# Patient Record
Sex: MALE | Race: BLACK OR AFRICAN AMERICAN | NOT HISPANIC OR LATINO | Employment: FULL TIME | URBAN - METROPOLITAN AREA
[De-identification: names, ages, dates, MRNs, and addresses within clinical notes are randomized per-mention and may not be internally consistent; named-entity substitution may affect disease eponyms.]

---

## 2017-06-20 LAB — EKG IMPRESSION: NORMAL

## 2017-06-20 PROCEDURE — 93005 ELECTROCARDIOGRAM TRACING: CPT

## 2017-06-20 PROCEDURE — 99285 EMERGENCY DEPT VISIT HI MDM: CPT

## 2017-06-20 ASSESSMENT — PAIN SCALES - GENERAL: PAINLEVEL_OUTOF10: 8

## 2017-06-21 ENCOUNTER — APPOINTMENT (OUTPATIENT)
Dept: RADIOLOGY | Facility: MEDICAL CENTER | Age: 42
DRG: 871 | End: 2017-06-21

## 2017-06-21 ENCOUNTER — RESOLUTE PROFESSIONAL BILLING HOSPITAL PROF FEE (OUTPATIENT)
Dept: HOSPITALIST | Facility: MEDICAL CENTER | Age: 42
End: 2017-06-21

## 2017-06-21 ENCOUNTER — HOSPITAL ENCOUNTER (OUTPATIENT)
Dept: RADIOLOGY | Facility: MEDICAL CENTER | Age: 42
End: 2017-06-21

## 2017-06-21 ENCOUNTER — HOSPITAL ENCOUNTER (INPATIENT)
Facility: MEDICAL CENTER | Age: 42
LOS: 3 days | DRG: 871 | End: 2017-06-24
Attending: EMERGENCY MEDICINE | Admitting: INTERNAL MEDICINE

## 2017-06-21 DIAGNOSIS — R65.21 SEPTIC SHOCK(785.52): ICD-10-CM

## 2017-06-21 DIAGNOSIS — J18.9 PNEUMONIA OF RIGHT LOWER LOBE DUE TO INFECTIOUS ORGANISM: ICD-10-CM

## 2017-06-21 DIAGNOSIS — A41.9 SEPSIS, DUE TO UNSPECIFIED ORGANISM: ICD-10-CM

## 2017-06-21 DIAGNOSIS — A41.9 SEPTIC SHOCK(785.52): ICD-10-CM

## 2017-06-21 DIAGNOSIS — E87.6 HYPOKALEMIA: ICD-10-CM

## 2017-06-21 DIAGNOSIS — E87.20 LACTIC ACID ACIDOSIS: ICD-10-CM

## 2017-06-21 DIAGNOSIS — E87.1 HYPONATREMIA: ICD-10-CM

## 2017-06-21 LAB
ABO GROUP BLD: NORMAL
ALBUMIN SERPL BCP-MCNC: 3.6 G/DL (ref 3.2–4.9)
ALBUMIN/GLOB SERPL: 0.8 G/DL
ALP SERPL-CCNC: 74 U/L (ref 30–99)
ALT SERPL-CCNC: 9 U/L (ref 2–50)
ANION GAP SERPL CALC-SCNC: 14 MMOL/L (ref 0–11.9)
ANISOCYTOSIS BLD QL SMEAR: ABNORMAL
APTT PPP: 34.3 SEC (ref 24.7–36)
AST SERPL-CCNC: 17 U/L (ref 12–45)
BASOPHILS # BLD AUTO: 0 % (ref 0–1.8)
BASOPHILS # BLD: 0 K/UL (ref 0–0.12)
BILIRUB SERPL-MCNC: 0.7 MG/DL (ref 0.1–1.5)
BLD GP AB SCN SERPL QL: NORMAL
BNP SERPL-MCNC: 49 PG/ML (ref 0–100)
BUN SERPL-MCNC: 20 MG/DL (ref 8–22)
CALCIUM SERPL-MCNC: 8.6 MG/DL (ref 8.5–10.5)
CHLORIDE SERPL-SCNC: 95 MMOL/L (ref 96–112)
CO2 SERPL-SCNC: 20 MMOL/L (ref 20–33)
CREAT SERPL-MCNC: 1.11 MG/DL (ref 0.5–1.4)
DEPRECATED D DIMER PPP IA-ACNC: 1236 NG/ML(D-DU)
EOSINOPHIL # BLD AUTO: 0 K/UL (ref 0–0.51)
EOSINOPHIL NFR BLD: 0 % (ref 0–6.9)
ERYTHROCYTE [DISTWIDTH] IN BLOOD BY AUTOMATED COUNT: 45.7 FL (ref 35.9–50)
GFR SERPL CREATININE-BSD FRML MDRD: >60 ML/MIN/1.73 M 2
GLOBULIN SER CALC-MCNC: 4.3 G/DL (ref 1.9–3.5)
GLUCOSE SERPL-MCNC: 101 MG/DL (ref 65–99)
HCT VFR BLD AUTO: 38.8 % (ref 42–52)
HGB BLD-MCNC: 13.5 G/DL (ref 14–18)
INR PPP: 1.2 (ref 0.87–1.13)
LACTATE BLD-SCNC: 1.9 MMOL/L (ref 0.5–2)
LACTATE BLD-SCNC: 2.3 MMOL/L (ref 0.5–2)
LACTATE BLD-SCNC: 3 MMOL/L (ref 0.5–2)
LIPASE SERPL-CCNC: <3 U/L (ref 11–82)
LYMPHOCYTES # BLD AUTO: 0.92 K/UL (ref 1–4.8)
LYMPHOCYTES NFR BLD: 4.3 % (ref 22–41)
MANUAL DIFF BLD: NORMAL
MCH RBC QN AUTO: 28.1 PG (ref 27–33)
MCHC RBC AUTO-ENTMCNC: 34.8 G/DL (ref 33.7–35.3)
MCV RBC AUTO: 80.8 FL (ref 81.4–97.8)
MICROCYTES BLD QL SMEAR: ABNORMAL
MONOCYTES # BLD AUTO: 0.75 K/UL (ref 0–0.85)
MONOCYTES NFR BLD AUTO: 3.5 % (ref 0–13.4)
MORPHOLOGY BLD-IMP: NORMAL
MYELOCYTES NFR BLD MANUAL: 1.7 %
NEUTROPHILS # BLD AUTO: 19.37 K/UL (ref 1.82–7.42)
NEUTROPHILS NFR BLD: 83.5 % (ref 44–72)
NEUTS BAND NFR BLD MANUAL: 7 % (ref 0–10)
NRBC # BLD AUTO: 0 K/UL
NRBC BLD AUTO-RTO: 0 /100 WBC
PLATELET # BLD AUTO: 279 K/UL (ref 164–446)
PLATELET BLD QL SMEAR: NORMAL
PMV BLD AUTO: 8.5 FL (ref 9–12.9)
POTASSIUM SERPL-SCNC: 3.2 MMOL/L (ref 3.6–5.5)
PROT SERPL-MCNC: 7.9 G/DL (ref 6–8.2)
PROTHROMBIN TIME: 15.6 SEC (ref 12–14.6)
RBC # BLD AUTO: 4.8 M/UL (ref 4.7–6.1)
RBC BLD AUTO: PRESENT
RH BLD: NORMAL
SODIUM SERPL-SCNC: 129 MMOL/L (ref 135–145)
TROPONIN I SERPL-MCNC: <0.01 NG/ML (ref 0–0.04)
WBC # BLD AUTO: 21.4 K/UL (ref 4.8–10.8)

## 2017-06-21 PROCEDURE — 700105 HCHG RX REV CODE 258: Performed by: INTERNAL MEDICINE

## 2017-06-21 PROCEDURE — 700102 HCHG RX REV CODE 250 W/ 637 OVERRIDE(OP): Performed by: INTERNAL MEDICINE

## 2017-06-21 PROCEDURE — 36415 COLL VENOUS BLD VENIPUNCTURE: CPT

## 2017-06-21 PROCEDURE — 86901 BLOOD TYPING SEROLOGIC RH(D): CPT

## 2017-06-21 PROCEDURE — 94760 N-INVAS EAR/PLS OXIMETRY 1: CPT

## 2017-06-21 PROCEDURE — 700117 HCHG RX CONTRAST REV CODE 255: Performed by: EMERGENCY MEDICINE

## 2017-06-21 PROCEDURE — 700111 HCHG RX REV CODE 636 W/ 250 OVERRIDE (IP): Performed by: EMERGENCY MEDICINE

## 2017-06-21 PROCEDURE — 99223 1ST HOSP IP/OBS HIGH 75: CPT | Performed by: INTERNAL MEDICINE

## 2017-06-21 PROCEDURE — 700111 HCHG RX REV CODE 636 W/ 250 OVERRIDE (IP)

## 2017-06-21 PROCEDURE — 700102 HCHG RX REV CODE 250 W/ 637 OVERRIDE(OP): Performed by: HOSPITALIST

## 2017-06-21 PROCEDURE — 83605 ASSAY OF LACTIC ACID: CPT | Mod: 91

## 2017-06-21 PROCEDURE — 80053 COMPREHEN METABOLIC PANEL: CPT

## 2017-06-21 PROCEDURE — 85379 FIBRIN DEGRADATION QUANT: CPT

## 2017-06-21 PROCEDURE — 96375 TX/PRO/DX INJ NEW DRUG ADDON: CPT

## 2017-06-21 PROCEDURE — 85730 THROMBOPLASTIN TIME PARTIAL: CPT

## 2017-06-21 PROCEDURE — 304562 HCHG STAT O2 MASK/CANNULA

## 2017-06-21 PROCEDURE — A9270 NON-COVERED ITEM OR SERVICE: HCPCS | Performed by: INTERNAL MEDICINE

## 2017-06-21 PROCEDURE — 770020 HCHG ROOM/CARE - TELE (206)

## 2017-06-21 PROCEDURE — 83690 ASSAY OF LIPASE: CPT

## 2017-06-21 PROCEDURE — 96365 THER/PROPH/DIAG IV INF INIT: CPT

## 2017-06-21 PROCEDURE — 86850 RBC ANTIBODY SCREEN: CPT

## 2017-06-21 PROCEDURE — 83880 ASSAY OF NATRIURETIC PEPTIDE: CPT

## 2017-06-21 PROCEDURE — 87040 BLOOD CULTURE FOR BACTERIA: CPT | Mod: 91

## 2017-06-21 PROCEDURE — 86900 BLOOD TYPING SEROLOGIC ABO: CPT

## 2017-06-21 PROCEDURE — 74177 CT ABD & PELVIS W/CONTRAST: CPT

## 2017-06-21 PROCEDURE — 71010 DX-CHEST-LIMITED (1 VIEW): CPT

## 2017-06-21 PROCEDURE — 71275 CT ANGIOGRAPHY CHEST: CPT

## 2017-06-21 PROCEDURE — 86713 LEGIONELLA ANTIBODY: CPT | Mod: 91

## 2017-06-21 PROCEDURE — 700105 HCHG RX REV CODE 258: Performed by: EMERGENCY MEDICINE

## 2017-06-21 PROCEDURE — 85027 COMPLETE CBC AUTOMATED: CPT

## 2017-06-21 PROCEDURE — A9270 NON-COVERED ITEM OR SERVICE: HCPCS | Performed by: HOSPITALIST

## 2017-06-21 PROCEDURE — 85007 BL SMEAR W/DIFF WBC COUNT: CPT

## 2017-06-21 PROCEDURE — 85610 PROTHROMBIN TIME: CPT

## 2017-06-21 PROCEDURE — 84484 ASSAY OF TROPONIN QUANT: CPT

## 2017-06-21 PROCEDURE — 96367 TX/PROPH/DG ADDL SEQ IV INF: CPT

## 2017-06-21 PROCEDURE — 700111 HCHG RX REV CODE 636 W/ 250 OVERRIDE (IP): Performed by: INTERNAL MEDICINE

## 2017-06-21 RX ORDER — SODIUM CHLORIDE 9 MG/ML
1000 INJECTION, SOLUTION INTRAVENOUS ONCE
Status: COMPLETED | OUTPATIENT
Start: 2017-06-21 | End: 2017-06-21

## 2017-06-21 RX ORDER — AMOXICILLIN 250 MG
2 CAPSULE ORAL 2 TIMES DAILY
Status: DISCONTINUED | OUTPATIENT
Start: 2017-06-21 | End: 2017-06-24 | Stop reason: HOSPADM

## 2017-06-21 RX ORDER — POTASSIUM CHLORIDE 750 MG/1
20 TABLET, FILM COATED, EXTENDED RELEASE ORAL DAILY
Status: DISCONTINUED | OUTPATIENT
Start: 2017-06-21 | End: 2017-06-24 | Stop reason: HOSPADM

## 2017-06-21 RX ORDER — MORPHINE SULFATE 4 MG/ML
4 INJECTION, SOLUTION INTRAMUSCULAR; INTRAVENOUS ONCE
Status: COMPLETED | OUTPATIENT
Start: 2017-06-21 | End: 2017-06-21

## 2017-06-21 RX ORDER — ONDANSETRON 2 MG/ML
4 INJECTION INTRAMUSCULAR; INTRAVENOUS ONCE
Status: COMPLETED | OUTPATIENT
Start: 2017-06-21 | End: 2017-06-21

## 2017-06-21 RX ORDER — CEFTRIAXONE 2 G/1
2 INJECTION, POWDER, FOR SOLUTION INTRAMUSCULAR; INTRAVENOUS ONCE
Status: COMPLETED | OUTPATIENT
Start: 2017-06-21 | End: 2017-06-21

## 2017-06-21 RX ORDER — ACETAMINOPHEN 325 MG/1
650 TABLET ORAL EVERY 6 HOURS PRN
Status: DISCONTINUED | OUTPATIENT
Start: 2017-06-21 | End: 2017-06-24 | Stop reason: HOSPADM

## 2017-06-21 RX ORDER — SODIUM CHLORIDE 9 MG/ML
30 INJECTION, SOLUTION INTRAVENOUS
Status: DISCONTINUED | OUTPATIENT
Start: 2017-06-21 | End: 2017-06-24 | Stop reason: HOSPADM

## 2017-06-21 RX ORDER — SODIUM CHLORIDE 9 MG/ML
1000 INJECTION, SOLUTION INTRAVENOUS
Status: DISCONTINUED | OUTPATIENT
Start: 2017-06-21 | End: 2017-06-24 | Stop reason: HOSPADM

## 2017-06-21 RX ORDER — AZITHROMYCIN 500 MG/1
500 INJECTION, POWDER, LYOPHILIZED, FOR SOLUTION INTRAVENOUS ONCE
Status: COMPLETED | OUTPATIENT
Start: 2017-06-21 | End: 2017-06-21

## 2017-06-21 RX ORDER — PROMETHAZINE HYDROCHLORIDE 25 MG/1
12.5-25 TABLET ORAL EVERY 4 HOURS PRN
Status: DISCONTINUED | OUTPATIENT
Start: 2017-06-21 | End: 2017-06-24 | Stop reason: HOSPADM

## 2017-06-21 RX ORDER — BISACODYL 10 MG
10 SUPPOSITORY, RECTAL RECTAL
Status: DISCONTINUED | OUTPATIENT
Start: 2017-06-21 | End: 2017-06-24 | Stop reason: HOSPADM

## 2017-06-21 RX ORDER — GUAIFENESIN/DEXTROMETHORPHAN 100-10MG/5
10 SYRUP ORAL EVERY 6 HOURS PRN
Status: DISCONTINUED | OUTPATIENT
Start: 2017-06-21 | End: 2017-06-24 | Stop reason: HOSPADM

## 2017-06-21 RX ORDER — PROMETHAZINE HYDROCHLORIDE 25 MG/1
12.5-25 SUPPOSITORY RECTAL EVERY 4 HOURS PRN
Status: DISCONTINUED | OUTPATIENT
Start: 2017-06-21 | End: 2017-06-24 | Stop reason: HOSPADM

## 2017-06-21 RX ORDER — MORPHINE SULFATE 4 MG/ML
INJECTION, SOLUTION INTRAMUSCULAR; INTRAVENOUS
Status: COMPLETED
Start: 2017-06-21 | End: 2017-06-21

## 2017-06-21 RX ORDER — SODIUM CHLORIDE 9 MG/ML
INJECTION, SOLUTION INTRAVENOUS CONTINUOUS
Status: DISCONTINUED | OUTPATIENT
Start: 2017-06-21 | End: 2017-06-24

## 2017-06-21 RX ORDER — HEPARIN SODIUM 5000 [USP'U]/ML
5000 INJECTION, SOLUTION INTRAVENOUS; SUBCUTANEOUS EVERY 8 HOURS
Status: DISCONTINUED | OUTPATIENT
Start: 2017-06-21 | End: 2017-06-24 | Stop reason: HOSPADM

## 2017-06-21 RX ORDER — ONDANSETRON 2 MG/ML
INJECTION INTRAMUSCULAR; INTRAVENOUS
Status: COMPLETED
Start: 2017-06-21 | End: 2017-06-21

## 2017-06-21 RX ORDER — ONDANSETRON 2 MG/ML
4 INJECTION INTRAMUSCULAR; INTRAVENOUS EVERY 4 HOURS PRN
Status: DISCONTINUED | OUTPATIENT
Start: 2017-06-21 | End: 2017-06-24 | Stop reason: HOSPADM

## 2017-06-21 RX ORDER — POLYETHYLENE GLYCOL 3350 17 G/17G
1 POWDER, FOR SOLUTION ORAL
Status: DISCONTINUED | OUTPATIENT
Start: 2017-06-21 | End: 2017-06-24 | Stop reason: HOSPADM

## 2017-06-21 RX ORDER — ONDANSETRON 4 MG/1
4 TABLET, ORALLY DISINTEGRATING ORAL EVERY 4 HOURS PRN
Status: DISCONTINUED | OUTPATIENT
Start: 2017-06-21 | End: 2017-06-24 | Stop reason: HOSPADM

## 2017-06-21 RX ADMIN — GUAIFENESIN AND DEXTROMETHORPHAN 10 ML: 100; 10 SYRUP ORAL at 10:55

## 2017-06-21 RX ADMIN — GUAIFENESIN AND DEXTROMETHORPHAN 10 ML: 100; 10 SYRUP ORAL at 20:32

## 2017-06-21 RX ADMIN — SODIUM CHLORIDE 1000 ML: 9 INJECTION, SOLUTION INTRAVENOUS at 04:20

## 2017-06-21 RX ADMIN — SODIUM CHLORIDE: 9 INJECTION, SOLUTION INTRAVENOUS at 13:12

## 2017-06-21 RX ADMIN — SENNOSIDES AND DOCUSATE SODIUM 2 TABLET: 8.6; 5 TABLET ORAL at 10:55

## 2017-06-21 RX ADMIN — ACETAMINOPHEN 650 MG: 325 TABLET, FILM COATED ORAL at 10:53

## 2017-06-21 RX ADMIN — SENNOSIDES AND DOCUSATE SODIUM 2 TABLET: 8.6; 5 TABLET ORAL at 20:27

## 2017-06-21 RX ADMIN — SODIUM CHLORIDE 1000 ML: 9 INJECTION, SOLUTION INTRAVENOUS at 01:05

## 2017-06-21 RX ADMIN — ONDANSETRON: 2 INJECTION INTRAMUSCULAR; INTRAVENOUS at 01:30

## 2017-06-21 RX ADMIN — MORPHINE SULFATE 4 MG: 4 INJECTION INTRAVENOUS at 01:30

## 2017-06-21 RX ADMIN — HEPARIN SODIUM 5000 UNITS: 5000 INJECTION, SOLUTION INTRAVENOUS; SUBCUTANEOUS at 14:00

## 2017-06-21 RX ADMIN — CEFTRIAXONE SODIUM 2 G: 2 INJECTION, POWDER, FOR SOLUTION INTRAMUSCULAR; INTRAVENOUS at 04:20

## 2017-06-21 RX ADMIN — SODIUM CHLORIDE: 9 INJECTION, SOLUTION INTRAVENOUS at 21:36

## 2017-06-21 RX ADMIN — AZITHROMYCIN MONOHYDRATE 500 MG: 500 INJECTION, POWDER, LYOPHILIZED, FOR SOLUTION INTRAVENOUS at 04:43

## 2017-06-21 RX ADMIN — IOHEXOL 100 ML: 350 INJECTION, SOLUTION INTRAVENOUS at 03:38

## 2017-06-21 RX ADMIN — ONDANSETRON 4 MG: 2 INJECTION INTRAMUSCULAR; INTRAVENOUS at 21:36

## 2017-06-21 RX ADMIN — HEPARIN SODIUM 5000 UNITS: 5000 INJECTION, SOLUTION INTRAVENOUS; SUBCUTANEOUS at 20:27

## 2017-06-21 RX ADMIN — POTASSIUM CHLORIDE 20 MEQ: 750 TABLET, FILM COATED, EXTENDED RELEASE ORAL at 10:56

## 2017-06-21 RX ADMIN — ONDANSETRON 4 MG: 2 INJECTION INTRAMUSCULAR; INTRAVENOUS at 08:28

## 2017-06-21 RX ADMIN — SODIUM CHLORIDE 1000 ML: 9 INJECTION, SOLUTION INTRAVENOUS at 02:00

## 2017-06-21 RX ADMIN — ONDANSETRON 4 MG: 2 INJECTION INTRAMUSCULAR; INTRAVENOUS at 17:29

## 2017-06-21 RX ADMIN — SODIUM CHLORIDE: 9 INJECTION, SOLUTION INTRAVENOUS at 08:28

## 2017-06-21 RX ADMIN — ONDANSETRON 4 MG: 2 INJECTION INTRAMUSCULAR; INTRAVENOUS at 13:07

## 2017-06-21 RX ADMIN — HEPARIN SODIUM 5000 UNITS: 5000 INJECTION, SOLUTION INTRAVENOUS; SUBCUTANEOUS at 08:28

## 2017-06-21 ASSESSMENT — LIFESTYLE VARIABLES
ALCOHOL_USE: NO
EVER_SMOKED: YES

## 2017-06-21 ASSESSMENT — COGNITIVE AND FUNCTIONAL STATUS - GENERAL
SUGGESTED CMS G CODE MODIFIER MOBILITY: CH
SUGGESTED CMS G CODE MODIFIER DAILY ACTIVITY: CH
MOBILITY SCORE: 24
DAILY ACTIVITIY SCORE: 24

## 2017-06-21 ASSESSMENT — PATIENT HEALTH QUESTIONNAIRE - PHQ9
SUM OF ALL RESPONSES TO PHQ9 QUESTIONS 1 AND 2: 0
SUM OF ALL RESPONSES TO PHQ QUESTIONS 1-9: 0
1. LITTLE INTEREST OR PLEASURE IN DOING THINGS: NOT AT ALL
2. FEELING DOWN, DEPRESSED, IRRITABLE, OR HOPELESS: NOT AT ALL

## 2017-06-21 ASSESSMENT — COPD QUESTIONNAIRES
HAVE YOU SMOKED AT LEAST 100 CIGARETTES IN YOUR ENTIRE LIFE: YES
COPD SCREENING SCORE: 4
DO YOU EVER COUGH UP ANY MUCUS OR PHLEGM?: YES, A FEW DAYS A WEEK OR MONTH
DURING THE PAST 4 WEEKS HOW MUCH DID YOU FEEL SHORT OF BREATH: SOME OF THE TIME

## 2017-06-21 ASSESSMENT — PAIN SCALES - GENERAL: PAINLEVEL_OUTOF10: 4

## 2017-06-21 NOTE — DIETARY
Nutrition Services - Poor PO PTA/Crohns disease     42 YO M admitted r/t PNA, SOB     Past Medical History   Diagnosis Date   • Crohn's colitis (CMS-HCC)      Pertinent Labs: lactic acid 2.3, Na 129, K+ 3.2, Cl 95, anion gap 14, glucose 101, globulin 4.3  Pertinent Meds: zithromax, rocephin, heparin, KCl, senna-docusate   GI: last BM 6/21  WT: 63.1 kg  Body mass index is 21.78 kg/(m^2).  Pt reports 10lbs weight loss x 2 months (6.7%)   SKIN: no pressure areas or edema documented at this time     Diet: regular  PO intake: 25-75%     PLAN/RECOMMEND      Consider MVI r/t crohn's disease    Encourage PO intake.    Recommend boost plus QID (chocolate) per Pt request        Nutrition Rep to see patient daily for meal preferences. Please document PO intake as percentage of meals consumed.     RD following

## 2017-06-21 NOTE — CARE PLAN
Problem: Knowledge Deficit  Goal: Knowledge of disease process/condition, treatment plan, diagnostic tests, and medications will improve  Outcome: PROGRESSING AS EXPECTED  Pt oriented to unit procedures and protocols. All questions answered. No concerns at this time. Pt communicates needs effectively.         Problem: Respiratory:  Goal: Respiratory status will improve  Outcome: PROGRESSING AS EXPECTED  Remains on 2 liters. Pt O2 remains above 92%. Has shortness of breath on exertion.

## 2017-06-21 NOTE — IP AVS SNAPSHOT
6/24/2017    Michael Paul  [address]  [place] [address]    Dear Michael:    [facility] wants to ensure your discharge home is safe and you or your loved ones have had all of your questions answered regarding your care after you leave the hospital.    Below is a list of resources and contact information should you have any questions regarding your hospital stay, follow-up instructions, or active medical symptoms.    Questions or Concerns Regarding… Contact   Medical Questions Related to Your Discharge  (7 days a week, 8am-5pm) Contact a Nurse Care Coordinator   [phone]   Medical Questions Not Related to Your Discharge  (24 hours a day / 7 days a week)  Contact the Nurse Health Line   [phone]    Medications or Discharge Instructions Refer to your discharge packet   or contact your [facility] Primary Care Provider   [phone]   Follow-up Appointment(s) Schedule your appointment via [employer]   or contact Scheduling [phone]   Billing Review your statement via [employer]  or contact Billing [phone]   Medical Records Review your records via [employer]   or contact Medical Records [phone]     You may receive a telephone call within two days of discharge. This call is to make certain you understand your discharge instructions and have the opportunity to have any questions answered. You can also easily access your medical information, test results and upcoming appointments via the [employer] free online health management tool. You can learn more and sign up at [employer]. For assistance setting up your [employer] account, please call [phone].    Once again, we want to ensure your discharge home is safe and that you have a clear understanding of any next steps in your care. If you have any questions or concerns, please do not hesitate to contact us, we are here for you. Thank you for choosing [facility] for your healthcare needs.    Sincerely,    Your [facility] Healthcare Team

## 2017-06-21 NOTE — PROGRESS NOTES
Patient admitted early this morning by Dr. Paul. I saw and examined him in follow up and spoke with his nurse. Orders reviewed, no new orders needed.

## 2017-06-21 NOTE — IP AVS SNAPSHOT
" <p align=\"LEFT\"><IMG SRC=\"//[email or web address]$/[email or web address]\" alt=\"Image\" WIDTH=\"50%\" HEIGHT=\"200\" BORDER=\"\"></p>                   Name:Michael Paul  Medical Record Number:[id number]  CSN: [id number]    YOB: 1975   Age: 41 y.o.  Sex: male  HT:1.702 m (5' 7\") WT: 61.8 kg (136 lb 3.9 oz)          Admit Date: 6/21/2017     Discharge Date:   Today's Date: 6/24/2017  Attending Doctor:  Marie Dumont M.D.                  Allergies:  Prednisone          Follow-up Information     1. Follow up with LEANNE Pedraza. Go on 7/5/2017.    Specialty:  Family Medicine    Why:  Please arrive at 8:30am for your appointment. Please bring 2 pay stubs, photo ID, proof of address, and list of medications.    Contact information    [address] [place] [id number] [phone]           Medication List      Take these Medications        Instructions    azithromycin 250 MG Tabs   Start taking on:  6/25/2017   Commonly known as:  ZITHROMAX    Take one tab daily until complete       cefdinir 300 MG Caps   Commonly known as:  OMNICEF    Take 1 Cap by mouth every 12 hours for 3 days.   Dose:  300 mg         "

## 2017-06-21 NOTE — ED NOTES
Late entry:  0048 - Pt to room via w/c - c/o chest pain, SOB, weakness. EKG done in triage.     10827- PIV established, labs sent    0130 - pt medicated per MAR    0150 - ERP notified about D-dimer    0200 - 2nd liter NS running    0310 - pt to CT

## 2017-06-21 NOTE — IP AVS SNAPSHOT
ConceptoMed Access Code: [email or web address]  Expires: 7/17/2017  4:19 AM    ConceptoMed  A secure, online tool to manage your health information     [employer]’s ConceptoMed® is a secure, online tool that connects you to your personalized health information from the privacy of your home -- day or night - making it very easy for you to manage your healthcare. Once the activation process is completed, you can even access your medical information using the ConceptoMed vicky, which is available for free in the Apple Vicky store or Google Play store.     ConceptoMed provides the following levels of access (as shown below):   My Chart Features   [facility] Primary Care Doctor [facility]  Specialists [facility]  Urgent  Care Non-[facility]  Primary Care  Doctor   Email your healthcare team securely and privately 24/7 X X X X   Manage appointments: schedule your next appointment; view details of past/upcoming appointments X      Request prescription refills. X      View recent personal medical records, including lab and immunizations X X X X   View health record, including health history, allergies, medications X X X X   Read reports about your outpatient visits, procedures, consult and ER notes X X X X   See your discharge summary, which is a recap of your hospital and/or ER visit that includes your diagnosis, lab results, and care plan. X X       How to register for ConceptoMed:  1. Go to  [email or web address].  2. Click on the Sign Up Now box, which takes you to the New Member Sign Up page. You will need to provide the following information:  a. Enter your ConceptoMed Access Code exactly as it appears at the top of this page. (You will not need to use this code after you’ve completed the sign-up process. If you do not sign up before the expiration date, you must request a new code.)   b. Enter your date of birth.   c. Enter your home email address.   d. Click Submit, and follow the next screen’s instructions.  3. Create a ConceptoMed ID. This will be your ConceptoMed  login ID and cannot be changed, so think of one that is secure and easy to remember.  4. Create a [email or web address] password. You can change your password at any time.  5. Enter your Password Reset Question and Answer. This can be used at a later time if you forget your password.   6. Enter your e-mail address. This allows you to receive e-mail notifications when new information is available in [email or web address].  7. Click Sign Up. You can now view your health information.    For assistance activating your [email or web address] account, call [phone]

## 2017-06-21 NOTE — PROGRESS NOTES
2 RN skin assessment completed with ROBB Wilson. Skin warm,dry/intact . No areas of concern noted.

## 2017-06-21 NOTE — IP AVS SNAPSHOT
" Home Care Instructions                                                                                                                  Name:Michael Paul  Medical Record Number:[id number]  CSN: [id number]    YOB: 1975   Age: 41 y.o.  Sex: male  HT:1.702 m (5' 7\") WT: 61.8 kg (136 lb 3.9 oz)          Admit Date: 6/21/2017     Discharge Date:   Today's Date: 6/24/2017  Attending Doctor:  Marie Dumont M.D.                  Allergies:  Prednisone            Discharge Instructions       Discharge Instructions    Discharged to home by car with friend. Discharged via wheelchair, hospital escort: Refused.  Special equipment needed: Not Applicable    Be sure to schedule a follow-up appointment with your primary care doctor or any specialists as instructed.     Discharge Plan:   Smoking Cessation Offered: Patient Refused  Influenza Vaccine Indication: Patient Refuses    I understand that a diet low in cholesterol, fat, and sodium is recommended for good health. Unless I have been given specific instructions below for another diet, I accept this instruction as my diet prescription.   Other diet: regular    Special Instructions: None    · Is patient discharged on Warfarin / Coumadin?   No     · Is patient Post Blood Transfusion?  No    Pneumonia, Adult  Pneumonia is an infection of the lungs.   CAUSES  Pneumonia may be caused by bacteria or a virus. Usually, the infection is caused by breathing in droplets from an infected person's cough or sneeze.   SYMPTOMS   Symptoms of pneumonia include:  · Cough.  · Fever.  · Chest pain.  · Rapid breathing.  · Shortness of breath.  · Shaking chills.  · Mucus production.  DIAGNOSIS   If you have the common symptoms of pneumonia, often your health care provider will confirm the diagnosis with a chest X-ray. The X-ray will show an abnormality in the lung if you have pneumonia. Other tests may be done on your blood, urine, or mucus (sputum) to find the specific cause of your " pneumonia. A blood gas test or pulse oximetry test may be needed to check how well your lungs are working.  TREATMENT   Your treatment will depend on whether your pneumonia is caused by bacteria or a virus.   · Bacterial pneumonia is treated with antibiotic medicine.  · Pneumonia that is caused by the influenza virus may be treated with an antiviral medicine.  · Pneumonia that is caused by a virus other than influenza will not respond to antibiotic medicine. This type of pneumonia will have to run its course.   HOME CARE INSTRUCTIONS   · Cough suppressants may be used if you are losing too much rest from coughing at night. However, you should try to avoid taking cough suppresants. This is because coughing helps to remove mucus from your lungs.  · Sleep in a semi-upright position at night. Try sleeping in a reclining chair, or place a few pillows under your head.  · Try using a cold steam vaporizer or humidifier in your home or bedroom. This may help loosen your mucus.  · If you were prescribed an antibiotic medicine, finish all of it even if you start to feel better.  · If you were prescribed an expectorant, take it as directed by your health care provider. This medicine loosens the mucus so you can cough it up.  · Take medicines only as directed by your health care provider.  · Do not smoke. If you are a smoker and continue to smoke, your cough may last several weeks after your pneumonia has cleared.  · Get rest when you feel tired, or as needed.  PREVENTION  A pneumococcal shot (vaccine) is available to prevent a common bacterial cause of pneumonia. This is usually suggested for:  · People over 65 years old.  · People on chemotherapy.  · People with chronic lung problems, such as bronchitis or emphysema.  · People with immune system problems.  If you are over 65 years old or have a high risk condition, you may receive the pneumococcal vaccine if you have not received it before. In some countries, a routine  influenza vaccine is also recommended. This vaccine can help prevent some cases of pneumonia. You may be offered the influenza vaccine as part of your care.  If you are a smoker, it is time to quit in order to prevent pneumonia in the future. You may receive instructions on how to stop smoking. Your health care provider can provide medicines and counseling to help you quit.  SEEK MEDICAL CARE IF:  · You have a fever.  · You cannot control your cough with suppressants at night, and you keep losing sleep.  SEEK IMMEDIATE MEDICAL CARE IF:   · You have worsening shortness of breath.  · You have increased chest pain.  · Your sickness becomes worse, especially if you are an older adult or have a weakened immune system.  · You cough up blood.  · You have pain that is getting worse or is not controlled with medicines.  · Your symptoms are getting worse rather than better.     This information is not intended to replace advice given to you by your health care provider. Make sure you discuss any questions you have with your health care provider.     Document Released: 12/18/2006 Document Revised: 01/08/2016 Document Reviewed: 04/13/2016  [employer] Interactive Patient Education ©2016 [employer]        Depression / Suicide Risk    As you are discharged from this [facility] facility, it is important to learn how to keep safe from harming yourself.    Recognize the warning signs:  · Abrupt changes in personality, positive or negative- including increase in energy   · Giving away possessions  · Change in eating patterns- significant weight changes-  positive or negative  · Change in sleeping patterns- unable to sleep or sleeping all the time   · Unwillingness or inability to communicate  · Depression  · Unusual sadness, discouragement and loneliness  · Talk of wanting to die  · Neglect of personal appearance   · Rebelliousness- reckless behavior  · Withdrawal from people/activities they love  · Confusion- inability to  concentrate     If you or a loved one observes any of these behaviors or has concerns about self-harm, here's what you can do:  · Talk about it- your feelings and reasons for harming yourself  · Remove any means that you might use to hurt yourself (examples: pills, rope, extension cords, firearm)  · Get professional help from the community (Mental Health, Substance Abuse, psychological counseling)  · Do not be alone:Call your Safe Contact- someone whom you trust who will be there for you.  · Call your local CRISIS HOTLINE 231-6251 or 649-779-1010  · Call your local Children's Mobile Crisis Response Team [place] (678) 859-6726 or www.[employer]  · Call the toll free National Suicide Prevention Hotlines   · National Suicide Prevention Lifeline 595-347-VHLF (2275)  · National Hope Line Network 800-SUICIDE (963-1264)        Follow-up Information     1. Follow up with LEANNE Pedraza. Go on 7/5/2017.    Specialty:  Family Medicine    Why:  Please arrive at 8:30am for your appointment. Please bring 2 pay stubs, photo ID, proof of address, and list of medications.    Contact information    [address] [place] [id number] [phone]           Discharge Medication Instructions:    Below are the medications your physician expects you to take upon discharge:    Review all your home medications and newly ordered medications with your doctor and/or pharmacist. Follow medication instructions as directed by your doctor and/or pharmacist.    Please keep your medication list with you and share with your physician.               Medication List      START taking these medications        Instructions    Morning Afternoon Evening Bedtime    azithromycin 250 MG Tabs   Start taking on:  6/25/2017   Last time this was given:  500 mg on 6/24/2017  8:18 AM   Commonly known as:  ZITHROMAX   Next Dose Due:  Next dose: tomorrow in the AM        Take one tab daily until complete                        cefdinir  300 MG Caps   Last time this was given:  300 mg on 6/24/2017  9:43 AM   Commonly known as:  OMNICEF   Next Dose Due:  Next dose: tonight        Take 1 Cap by mouth every 12 hours for 3 days.   Dose:  300 mg                             Where to Get Your Medications      Information about where to get these medications is not yet available     ! Ask your nurse or doctor about these medications    - azithromycin 250 MG Tabs  - cefdinir 300 MG Caps            Instructions           Diet / Nutrition:    Follow any diet instructions given to you by your doctor or the dietician, including how much salt (sodium) you are allowed each day.    If you are overweight, talk to your doctor about a weight reduction plan.    Activity:    Remain physically active following your doctor's instructions about exercise and activity.    Rest often.     Any time you become even a little tired or short of breath, SIT DOWN and rest.    Worsening Symptoms:    Report any of the following signs and symptoms to the doctor's office immediately:    *Pain of jaw, arm, or neck  *Chest pain not relieved by medication                               *Dizziness or loss of consciousness  *Difficulty breathing even when at rest   *More tired than usual                                       *Bleeding drainage or swelling of surgical site  *Swelling of feet, ankles, legs or stomach                 *Fever (>100ºF)  *Pink or blood tinged sputum  *Weight gain (3lbs/day or 5lbs /week)           *Shock from internal defibrillator (if applicable)  *Palpitations or irregular heartbeats                *Cool and/or numb extremities    Stroke Awareness    Common Risk Factors for Stroke include:    Age  Atrial Fibrillation  Carotid Artery Stenosis  Diabetes Mellitus  Excessive alcohol consumption  High blood pressure  Overweight   Physical inactivity  Smoking    Warning signs and symptoms of a stroke include:    *Sudden numbness or weakness of the face, arm or leg  (especially on one side of the body).  *Sudden confusion, trouble speaking or understanding.  *Sudden trouble seeing in one or both eyes.  *Sudden trouble walking, dizziness, loss of balance or coordination.Sudden severe headache with no known cause.    It is very important to get treatment quickly when a stroke occurs. If you experience any of the above warning signs, call 911 immediately.                   Disclaimer         Quit Smoking / Tobacco Use:    I understand the use of any tobacco products increases my chance of suffering from future heart disease or stroke and could cause other illnesses which may shorten my life. Quitting the use of tobacco products is the single most important thing I can do to improve my health. For further information on smoking / tobacco cessation call a Toll Free Quit Line at 1-967.567.2740 (*National Cancer [place]) or 1-937.929.9456 (American Lung Association) or you can access the web based program at www.lungusa.org.    [place] Tobacco Users Help Line:  (369) 802-5625       Toll Free: 1-796.694.8140  Quit Tobacco Program [facility] (113)629-6160    Crisis Hotline:    [place] Crisis Hotline:  7-325-SULEBBH or 1-383.528.5692    [place] Crisis Hotline:    1-619.511.1658 or 299-913-3758    Discharge Survey:   Thank you for choosing [facility]. We hope we did everything we could to make your hospital stay a pleasant one. You may be receiving a phone survey and we would appreciate your time and participation in answering the questions. Your input is very valuable to us in our efforts to improve our service to our patients and their families.        My signature on this form indicates that:    1. I have reviewed and understand the above information.  2. My questions regarding this information have been answered to my satisfaction.  3. I have formulated a plan with my discharge nurse to obtain my prescribed medications for home.                  Disclaimer           __________________________________                     __________       ________                       Patient Signature                                                 Date                    Time

## 2017-06-21 NOTE — H&P
DATE OF SERVICE:  06/21/2017    CHIEF COMPLAINT: Cough and shortness of breath.    HISTORY OF PRESENT ILLNESS:  This is a 41-year-old male with no past medical   history who works at [employer], basically woke up Monday morning feeling short of   breath, feeling nauseated, not much coughing, but he started having fever and   chills.  As the day went on, his breathing got worse and he started coughing   with productive phlegm.  He came to the point that he felt tired and with body   malaise.  Patient decided to come to the hospital [facility].  He also noted   that when he cough, he cough up some blood-streaked sputum 4 times a day for   the past couple of days.    PAST MEDICAL HISTORY:  Crohn's disease.    PAST SURGICAL HISTORY:  He had I and D on his back.    SOCIAL HISTORY:  He drinks rarely, smokes 2-3 cigarettes per day.  He smokes   weed daily for his Crohn's disease.    FAMILY HISTORY:  Positive Crohn's disease in the family.  No cancer.    ALLERGIES:  PREDNISONE.    HOME MEDICATIONS:  None.    REVIEW OF SYSTEMS:  All other systems reviewed were negative.    PHYSICAL EXAMINATION:  VITAL SIGNS:  Blood pressure is 109/75, pulse of 131, respiratory rate of 17,   temperature 38.1 and oxygen is 93%.  GENERAL:  Patient is lying in bed, he is kind of anxious, in mild distress.  HEENT:  Head, normocephalic, atraumatic.  Eyes, pupils are reactive to light,   anicteric sclerae, pinkish palpebral conjunctivae.  Oral mucosa, no oral   lesions noted, moist mucosa.  NECK:  No JVD.  No lymphadenopathy.  No thyromegaly.  CHEST AND LUNGS:  Equal expansion.  Faint crackles noted in the bilateral   lower lung fields.  No tenderness to palpation.  CARDIOVASCULAR SYSTEM:  Tachycardic.  No murmurs noted.  GASTROINTESTINAL:  Positive bowel sounds.  No tenderness or   hepatosplenomegaly.  EXTREMITIES:  Palpable in both upper and lower extremities.  No edema noted.  NEUROLOGIC:  Cranial nerves II-XII intact.  Alert and oriented x3.  SKIN:   No cyanosis.  Capillary refill time normal.    LABORATORY DATA:  WBC is 21.4, hemoglobin 13.5, hematocrit 38.8, platelet   count of 279.  Sodium of 129, potassium 3.2, chloride of 95, CO2 20, anion gap   of 14, glucose of 101, BUN 20, creatinine of 1.11.  Lactic acid of 3.    D-dimer 1,236.  CT of the abdomen and pelvis showed right lower lobe   pneumonia.  No intraabdominal inflammatory processes identified.  Appendix is   not visualized.  No bowel obstruction or pneumoperitoneum.  CT scan of the   chest showed multifocal pneumonia, primarily involving the right lower lobe   where there is a dense consolidation.  No filling defects within the pulmonary   arteries to indicate emboli.  Chest x-ray showed right basilar pneumonia.    ASSESSMENT AND PLAN:  1.  Sepsis secondary to community-acquired pneumonia.  I will cover him with   Rocephin and Zithromax.  We will get [employer]' titer that fact that he   works in a [employer].  He also mentioned to me that he has some co-workers got   sick, but that was from last month.  So far, he does not know he has some   co-workers that is sick at the moment.  We will trend lactic acid and give   fluid boluses per protocol.  2.  Hyponatremia.  We will recheck it in the morning.  3.  Crohn's disease, currently stable.  4.  Deep venous thrombosis.  I will put him on heparin 5000 units subQ q. 8   hours.    Patient will most likely stay for more than 2 midnights.       ____________________________________     MD DEREK Forman / NATALI    DD:  06/21/2017 06:34:43  DT:  06/21/2017 07:07:09    D#:  [id number]  Job#:  [id number]

## 2017-06-21 NOTE — ED NOTES
Michael Paul  41 y.o.  Chief Complaint   Patient presents with   • Chest Pain     x 2 days, R chest   • Blood in Vomit     bright red, 4x daily for past 2 days     Ambulatory to triage with above complaints. EKG completed in triage per protocol.        Triage process explained to patient, apologized for wait time, and returned to lobby.

## 2017-06-21 NOTE — PROGRESS NOTES
Patient from ED to room 733-1, no report on patient. Call to ED Deo og not aware of patient .  Pt  AOx4 , walks  from stretcher to bed.  Oriented to new room.  Denies pain or discomfort at this time.All safety precautions in place. Will continue to monitor.

## 2017-06-22 LAB
ALBUMIN SERPL BCP-MCNC: 3 G/DL (ref 3.2–4.9)
ALBUMIN/GLOB SERPL: 0.8 G/DL
ALP SERPL-CCNC: 79 U/L (ref 30–99)
ALT SERPL-CCNC: 13 U/L (ref 2–50)
ANION GAP SERPL CALC-SCNC: 9 MMOL/L (ref 0–11.9)
AST SERPL-CCNC: 22 U/L (ref 12–45)
BASOPHILS # BLD AUTO: 0 % (ref 0–1.8)
BASOPHILS # BLD: 0 K/UL (ref 0–0.12)
BILIRUB SERPL-MCNC: 0.4 MG/DL (ref 0.1–1.5)
BUN SERPL-MCNC: 11 MG/DL (ref 8–22)
CALCIUM SERPL-MCNC: 8.4 MG/DL (ref 8.5–10.5)
CHLORIDE SERPL-SCNC: 107 MMOL/L (ref 96–112)
CO2 SERPL-SCNC: 19 MMOL/L (ref 20–33)
CREAT SERPL-MCNC: 0.79 MG/DL (ref 0.5–1.4)
EOSINOPHIL # BLD AUTO: 0 K/UL (ref 0–0.51)
EOSINOPHIL NFR BLD: 0 % (ref 0–6.9)
ERYTHROCYTE [DISTWIDTH] IN BLOOD BY AUTOMATED COUNT: 45.6 FL (ref 35.9–50)
GFR SERPL CREATININE-BSD FRML MDRD: >60 ML/MIN/1.73 M 2
GLOBULIN SER CALC-MCNC: 3.7 G/DL (ref 1.9–3.5)
GLUCOSE SERPL-MCNC: 88 MG/DL (ref 65–99)
HCT VFR BLD AUTO: 33.6 % (ref 42–52)
HGB BLD-MCNC: 11.4 G/DL (ref 14–18)
LACTATE BLD-SCNC: 1.3 MMOL/L (ref 0.5–2)
LYMPHOCYTES # BLD AUTO: 0.7 K/UL (ref 1–4.8)
LYMPHOCYTES NFR BLD: 3.6 % (ref 22–41)
MANUAL DIFF BLD: ABNORMAL
MCH RBC QN AUTO: 27.7 PG (ref 27–33)
MCHC RBC AUTO-ENTMCNC: 33.9 G/DL (ref 33.7–35.3)
MCV RBC AUTO: 81.8 FL (ref 81.4–97.8)
METAMYELOCYTES NFR BLD MANUAL: 0.7 %
MONOCYTES # BLD AUTO: 0.55 K/UL (ref 0–0.85)
MONOCYTES NFR BLD AUTO: 2.8 % (ref 0–13.4)
MORPHOLOGY BLD-IMP: NORMAL
NEUTROPHILS # BLD AUTO: 18.12 K/UL (ref 1.82–7.42)
NEUTROPHILS NFR BLD: 80 % (ref 44–72)
NEUTS BAND NFR BLD MANUAL: 12.9 % (ref 0–10)
NRBC # BLD AUTO: 0 K/UL
NRBC BLD AUTO-RTO: 0 /100 WBC
PLATELET # BLD AUTO: 247 K/UL (ref 164–446)
PLATELET BLD QL SMEAR: NORMAL
PMV BLD AUTO: 8.7 FL (ref 9–12.9)
POTASSIUM SERPL-SCNC: 3.1 MMOL/L (ref 3.6–5.5)
PROT SERPL-MCNC: 6.7 G/DL (ref 6–8.2)
RBC # BLD AUTO: 4.11 M/UL (ref 4.7–6.1)
RBC BLD AUTO: NORMAL
SODIUM SERPL-SCNC: 135 MMOL/L (ref 135–145)
WBC # BLD AUTO: 19.5 K/UL (ref 4.8–10.8)

## 2017-06-22 PROCEDURE — 85027 COMPLETE CBC AUTOMATED: CPT

## 2017-06-22 PROCEDURE — 700105 HCHG RX REV CODE 258: Performed by: INTERNAL MEDICINE

## 2017-06-22 PROCEDURE — 36415 COLL VENOUS BLD VENIPUNCTURE: CPT

## 2017-06-22 PROCEDURE — 700102 HCHG RX REV CODE 250 W/ 637 OVERRIDE(OP): Performed by: INTERNAL MEDICINE

## 2017-06-22 PROCEDURE — 700111 HCHG RX REV CODE 636 W/ 250 OVERRIDE (IP): Performed by: INTERNAL MEDICINE

## 2017-06-22 PROCEDURE — A9270 NON-COVERED ITEM OR SERVICE: HCPCS | Performed by: INTERNAL MEDICINE

## 2017-06-22 PROCEDURE — 80053 COMPREHEN METABOLIC PANEL: CPT

## 2017-06-22 PROCEDURE — 770020 HCHG ROOM/CARE - TELE (206)

## 2017-06-22 PROCEDURE — 83605 ASSAY OF LACTIC ACID: CPT

## 2017-06-22 PROCEDURE — 700102 HCHG RX REV CODE 250 W/ 637 OVERRIDE(OP): Performed by: HOSPITALIST

## 2017-06-22 PROCEDURE — 99233 SBSQ HOSP IP/OBS HIGH 50: CPT | Performed by: HOSPITALIST

## 2017-06-22 PROCEDURE — 85007 BL SMEAR W/DIFF WBC COUNT: CPT

## 2017-06-22 PROCEDURE — A9270 NON-COVERED ITEM OR SERVICE: HCPCS | Performed by: HOSPITALIST

## 2017-06-22 RX ORDER — AZITHROMYCIN 250 MG/1
500 TABLET, FILM COATED ORAL DAILY
Status: DISCONTINUED | OUTPATIENT
Start: 2017-06-23 | End: 2017-06-24 | Stop reason: HOSPADM

## 2017-06-22 RX ADMIN — HEPARIN SODIUM 5000 UNITS: 5000 INJECTION, SOLUTION INTRAVENOUS; SUBCUTANEOUS at 06:26

## 2017-06-22 RX ADMIN — HEPARIN SODIUM 5000 UNITS: 5000 INJECTION, SOLUTION INTRAVENOUS; SUBCUTANEOUS at 20:21

## 2017-06-22 RX ADMIN — SODIUM CHLORIDE: 9 INJECTION, SOLUTION INTRAVENOUS at 06:29

## 2017-06-22 RX ADMIN — AZITHROMYCIN FOR INJECTION INJECTION, POWDER, LYOPHILIZED, FOR SOLUTION 500 MG: 500 INJECTION INTRAVENOUS at 08:28

## 2017-06-22 RX ADMIN — CEFTRIAXONE SODIUM 1 G: 1 INJECTION, POWDER, FOR SOLUTION INTRAMUSCULAR; INTRAVENOUS at 10:16

## 2017-06-22 RX ADMIN — ACETAMINOPHEN 650 MG: 325 TABLET, FILM COATED ORAL at 02:33

## 2017-06-22 RX ADMIN — GUAIFENESIN AND DEXTROMETHORPHAN 10 ML: 100; 10 SYRUP ORAL at 15:59

## 2017-06-22 RX ADMIN — HEPARIN SODIUM 5000 UNITS: 5000 INJECTION, SOLUTION INTRAVENOUS; SUBCUTANEOUS at 15:59

## 2017-06-22 RX ADMIN — SODIUM CHLORIDE: 9 INJECTION, SOLUTION INTRAVENOUS at 16:05

## 2017-06-22 RX ADMIN — POTASSIUM CHLORIDE 20 MEQ: 750 TABLET, FILM COATED, EXTENDED RELEASE ORAL at 08:29

## 2017-06-22 RX ADMIN — ONDANSETRON 4 MG: 2 INJECTION INTRAMUSCULAR; INTRAVENOUS at 15:59

## 2017-06-22 RX ADMIN — GUAIFENESIN AND DEXTROMETHORPHAN 10 ML: 100; 10 SYRUP ORAL at 02:32

## 2017-06-22 ASSESSMENT — ENCOUNTER SYMPTOMS
SHORTNESS OF BREATH: 1
COUGH: 1
MUSCULOSKELETAL NEGATIVE: 1
MYALGIAS: 0
WEIGHT LOSS: 0
NAUSEA: 0
WEAKNESS: 0
DIZZINESS: 0
HEADACHES: 0
VOMITING: 0
BRUISES/BLEEDS EASILY: 0
DEPRESSION: 0
FEVER: 0
SPUTUM PRODUCTION: 1
WHEEZING: 0
ABDOMINAL PAIN: 0
CHILLS: 0
NERVOUS/ANXIOUS: 0
BACK PAIN: 0

## 2017-06-22 ASSESSMENT — PAIN SCALES - GENERAL
PAINLEVEL_OUTOF10: 0
PAINLEVEL_OUTOF10: 3

## 2017-06-22 NOTE — PROGRESS NOTES
Bedside report performed with Zarina. Pt is a/o, safety check performed, all possessions and call bell within reach. POC and doctors orders addressed as needed.

## 2017-06-22 NOTE — CARE PLAN
Problem: Respiratory:  Goal: Respiratory status will improve  Outcome: PROGRESSING AS EXPECTED  On room air,  pt O2 remains above 92%. Has shortness of breath on exertion

## 2017-06-22 NOTE — PROGRESS NOTES
[facility]    Date of Service: 2017    Chief Complaint  41 y.o. male admitted 2017 with pneumonia and sepsis.    Interval Problem Update  Fatigued and short of breath but doing better now on room air  Blood pressures ok 99 systolic    Consultants/Specialty  None.    Disposition  TBD.        Review of Systems   Constitutional: Positive for malaise/fatigue. Negative for fever, chills and weight loss.   Respiratory: Positive for cough, sputum production and shortness of breath. Negative for wheezing.    Cardiovascular: Negative for chest pain and leg swelling.   Gastrointestinal: Negative for nausea, vomiting and abdominal pain.   Genitourinary: Negative.  Negative for dysuria.   Musculoskeletal: Negative.  Negative for myalgias and back pain.   Skin: Negative for rash.   Neurological: Negative for dizziness, weakness and headaches.   Endo/Heme/Allergies: Negative.  Does not bruise/bleed easily.   Psychiatric/Behavioral: Negative for depression. The patient is not nervous/anxious.    All other systems reviewed and are negative.     Physical Exam  Laboratory/Imaging   Hemodynamics  Temp (24hrs), Av.1 °C (98.8 °F), Min:36.8 °C (98.3 °F), Max:37.4 °C (99.4 °F)   Temperature: 37.1 °C (98.8 °F)  Pulse  Av.4  Min: 65  Max: 131    Blood Pressure: (!) 99/69 mmHg      Respiratory      Respiration: 18, Pulse Oximetry: 99 %     Work Of Breathing / Effort: Mild  RUL Breath Sounds: Diminished, RML Breath Sounds: Diminished, RLL Breath Sounds: Diminished, TRAVIS Breath Sounds: Clear, LLL Breath Sounds: Clear    Fluids    Intake/Output Summary (Last 24 hours) at 17 1424  Last data filed at 17 1800   Gross per 24 hour   Intake   1250 ml   Output      0 ml   Net   1250 ml       Nutrition  Orders Placed This Encounter   Procedures   • Diet Order     Standing Status: Standing      Number of Occurrences: 1      Standing Expiration Date:      Order Specific Question:  Diet:     Answer:   Regular [1]     Physical Exam   Constitutional: He appears well-developed and well-nourished. No distress.   HENT:   Nose: Nose normal.   Mouth/Throat: Oropharynx is clear and moist. No oropharyngeal exudate.   Eyes: Conjunctivae are normal. Right eye exhibits no discharge. Left eye exhibits no discharge. No scleral icterus.   Neck: No JVD present. No tracheal deviation present.   Cardiovascular: Normal rate, regular rhythm and normal heart sounds.    Pulmonary/Chest: Effort normal. No stridor. No respiratory distress. He has no wheezes. He has rales. He exhibits no tenderness.   Abdominal: Soft. Bowel sounds are normal. He exhibits no distension. There is no tenderness.   Musculoskeletal: He exhibits no edema or tenderness.   Neurological: He is alert. No cranial nerve deficit. He exhibits normal muscle tone.   Skin: Skin is warm and dry. He is not diaphoretic. No pallor.   Psychiatric: He has a normal mood and affect. His behavior is normal.   Nursing note and vitals reviewed.      Recent Labs      06/21/17   0104  06/22/17   0601   WBC  21.4*  19.5*   RBC  4.80  4.11*   HEMOGLOBIN  13.5*  11.4*   HEMATOCRIT  38.8*  33.6*   MCV  80.8*  81.8   MCH  28.1  27.7   MCHC  34.8  33.9   RDW  45.7  45.6   PLATELETCT  279  247   MPV  8.5*  8.7*     Recent Labs      06/21/17   0104  06/22/17   0601   SODIUM  129*  135   POTASSIUM  3.2*  3.1*   CHLORIDE  95*  107   CO2  20  19*   GLUCOSE  101*  88   BUN  20  11   CREATININE  1.11  0.79   CALCIUM  8.6  8.4*     Recent Labs      06/21/17   0846   APTT  34.3   INR  1.20*     Recent Labs      06/21/17   0104   BNPBTYPENAT  49              Assessment/Plan     * Pneumonia (present on admission)  Assessment & Plan  Continue ceftriaxone and azithromycin  RT protocol  Oxygen as needed now on room air    Crohn's disease (CMS-HCC) (present on admission)  Assessment & Plan  By history  Uses medical marijuana at home to help with symptoms    Hyponatremia (present on admission)  Assessment  & Plan  Improved with fluids normal saline, now normal    Smoking (present on admission)  Assessment & Plan  Discussed cessation, offered nicotine replacement.    Sepsis (CMS-HCC) (present on admission)  Assessment & Plan  Protocol ordered on admission  Boluses prn  Improved on antibiotics  Follow cultures    Hypokalemia (present on admission)  Assessment & Plan  Oral replacement 40meq po today and recheck in am    Radiology images reviewed, EKG reviewed, Labs reviewed and Medications reviewed  Oneal catheter: No Oneal      DVT Prophylaxis: Heparin

## 2017-06-23 LAB
ANION GAP SERPL CALC-SCNC: 6 MMOL/L (ref 0–11.9)
BUN SERPL-MCNC: 9 MG/DL (ref 8–22)
CALCIUM SERPL-MCNC: 8.5 MG/DL (ref 8.5–10.5)
CHLORIDE SERPL-SCNC: 107 MMOL/L (ref 96–112)
CO2 SERPL-SCNC: 23 MMOL/L (ref 20–33)
CREAT SERPL-MCNC: 0.71 MG/DL (ref 0.5–1.4)
GFR SERPL CREATININE-BSD FRML MDRD: >60 ML/MIN/1.73 M 2
GLUCOSE SERPL-MCNC: 94 MG/DL (ref 65–99)
L PNEUMO IGG TITR SER IF: NORMAL {TITER}
L PNEUMO1 IGG TITR SER IF: NORMAL {TITER}
POTASSIUM SERPL-SCNC: 3.5 MMOL/L (ref 3.6–5.5)
SODIUM SERPL-SCNC: 136 MMOL/L (ref 135–145)

## 2017-06-23 PROCEDURE — 80048 BASIC METABOLIC PNL TOTAL CA: CPT

## 2017-06-23 PROCEDURE — A9270 NON-COVERED ITEM OR SERVICE: HCPCS

## 2017-06-23 PROCEDURE — 700105 HCHG RX REV CODE 258: Performed by: INTERNAL MEDICINE

## 2017-06-23 PROCEDURE — 700102 HCHG RX REV CODE 250 W/ 637 OVERRIDE(OP)

## 2017-06-23 PROCEDURE — A9270 NON-COVERED ITEM OR SERVICE: HCPCS | Performed by: INTERNAL MEDICINE

## 2017-06-23 PROCEDURE — 700102 HCHG RX REV CODE 250 W/ 637 OVERRIDE(OP): Performed by: HOSPITALIST

## 2017-06-23 PROCEDURE — A9270 NON-COVERED ITEM OR SERVICE: HCPCS | Performed by: HOSPITALIST

## 2017-06-23 PROCEDURE — 99233 SBSQ HOSP IP/OBS HIGH 50: CPT | Performed by: HOSPITALIST

## 2017-06-23 PROCEDURE — 700111 HCHG RX REV CODE 636 W/ 250 OVERRIDE (IP): Performed by: INTERNAL MEDICINE

## 2017-06-23 PROCEDURE — 36415 COLL VENOUS BLD VENIPUNCTURE: CPT

## 2017-06-23 PROCEDURE — 700102 HCHG RX REV CODE 250 W/ 637 OVERRIDE(OP): Performed by: INTERNAL MEDICINE

## 2017-06-23 PROCEDURE — 770020 HCHG ROOM/CARE - TELE (206)

## 2017-06-23 RX ADMIN — SODIUM CHLORIDE: 9 INJECTION, SOLUTION INTRAVENOUS at 08:35

## 2017-06-23 RX ADMIN — ACETAMINOPHEN 650 MG: 325 TABLET, FILM COATED ORAL at 06:33

## 2017-06-23 RX ADMIN — HEPARIN SODIUM 5000 UNITS: 5000 INJECTION, SOLUTION INTRAVENOUS; SUBCUTANEOUS at 15:04

## 2017-06-23 RX ADMIN — GUAIFENESIN AND DEXTROMETHORPHAN 10 ML: 100; 10 SYRUP ORAL at 23:07

## 2017-06-23 RX ADMIN — CEFTRIAXONE SODIUM 1 G: 1 INJECTION, POWDER, FOR SOLUTION INTRAMUSCULAR; INTRAVENOUS at 08:35

## 2017-06-23 RX ADMIN — HEPARIN SODIUM 5000 UNITS: 5000 INJECTION, SOLUTION INTRAVENOUS; SUBCUTANEOUS at 21:02

## 2017-06-23 RX ADMIN — AZITHROMYCIN 500 MG: 250 TABLET, FILM COATED ORAL at 08:35

## 2017-06-23 RX ADMIN — ACETAMINOPHEN 650 MG: 325 TABLET, FILM COATED ORAL at 23:07

## 2017-06-23 RX ADMIN — GUAIFENESIN AND DEXTROMETHORPHAN 10 ML: 100; 10 SYRUP ORAL at 00:47

## 2017-06-23 RX ADMIN — SODIUM CHLORIDE: 9 INJECTION, SOLUTION INTRAVENOUS at 17:19

## 2017-06-23 RX ADMIN — HEPARIN SODIUM 5000 UNITS: 5000 INJECTION, SOLUTION INTRAVENOUS; SUBCUTANEOUS at 05:28

## 2017-06-23 RX ADMIN — POTASSIUM CHLORIDE 20 MEQ: 750 TABLET, FILM COATED, EXTENDED RELEASE ORAL at 08:35

## 2017-06-23 RX ADMIN — SODIUM CHLORIDE: 9 INJECTION, SOLUTION INTRAVENOUS at 00:47

## 2017-06-23 ASSESSMENT — ENCOUNTER SYMPTOMS
CHILLS: 0
VOMITING: 0
WHEEZING: 0
HEADACHES: 0
SHORTNESS OF BREATH: 1
COUGH: 1
WEAKNESS: 0
WEIGHT LOSS: 0
ABDOMINAL PAIN: 0
MUSCULOSKELETAL NEGATIVE: 1
SPUTUM PRODUCTION: 1
NAUSEA: 0
NERVOUS/ANXIOUS: 0
FEVER: 0
BACK PAIN: 0
BRUISES/BLEEDS EASILY: 0
DEPRESSION: 0
MYALGIAS: 0
DIZZINESS: 0

## 2017-06-23 ASSESSMENT — PAIN SCALES - GENERAL
PAINLEVEL_OUTOF10: 0
PAINLEVEL_OUTOF10: 0

## 2017-06-23 NOTE — CARE PLAN
Problem: Communication  Goal: The ability to communicate needs accurately and effectively will improve  Outcome: PROGRESSING AS EXPECTED  Intervention: Guion patient and significant other/support system to call light to alert staff of needs  Patient oriented to surroundings and the unit policies/routines.  Educated and understands the use of the call button for assistance.      Problem: Safety  Goal: Will remain free from falls  Outcome: PROGRESSING AS EXPECTED  Intervention: Assess risk factors for falls  Patient mobility assessed.  He is able to ambulate independently.  Educated and understands the use of the call button if any assistance with mobility is needed to prevent injuries/falls.

## 2017-06-23 NOTE — PROGRESS NOTES
Bedside report from night shift nurse, assumed patient care.  Patient alert and oriented x 4, informed of POC , verbalized understanding.IVF infusing at 125cc/hr. No complaints of pain at this time.  Safety precautions in place, bed in low locked position, call light within reach.

## 2017-06-23 NOTE — CARE PLAN
Problem: Nutritional:  Goal: Achieve adequate nutritional intake  Patient will consume 50% of meals   Outcome: PROGRESSING AS EXPECTED  25-75% of meals, 50-75% of supplements

## 2017-06-23 NOTE — CARE PLAN
Problem: Pain Management  Goal: Pain level will decrease to patient’s comfort goal  Outcome: PROGRESSING AS EXPECTED  Patient assessed per unit policy, verbalized understanding to call when needing pain relief.          Problem: Respiratory:  Goal: Respiratory status will improve  Outcome: PROGRESSING AS EXPECTED  Pt chest rise equal with no increased work of breathing,  O2 saturation consistently in high to mid 90's on room air.   Has shortness of breath on exertion

## 2017-06-23 NOTE — PROGRESS NOTES
Bedside report completed.  Assumed pt care. Patient in bed, resting, in no apparent distress.  Safety precautions in place. Call light & personal belongings within reach.  Plan of care discussed.  Patient is on room air, IV is running NS @ 125 mL/hour.  No reports of pain.  Patient ambulated independently to the bathroom and twice around the unit.  No needs expressed at this time.  Will continue to monitor.

## 2017-06-23 NOTE — PROGRESS NOTES
[facility]    Date of Service: 2017    Chief Complaint  41 y.o. male admitted 2017 with pneumonia and sepsis.    Interval Problem Update  Sleeping a lot  Pressures have come up  Tachycardia improving      Consultants/Specialty  None.    Disposition  TBD.        Review of Systems   Constitutional: Positive for malaise/fatigue. Negative for fever, chills and weight loss.   Respiratory: Positive for cough, sputum production and shortness of breath. Negative for wheezing.    Cardiovascular: Negative for chest pain and leg swelling.   Gastrointestinal: Negative for nausea, vomiting and abdominal pain.   Genitourinary: Negative.  Negative for dysuria.   Musculoskeletal: Negative.  Negative for myalgias and back pain.   Skin: Negative for rash.   Neurological: Negative for dizziness, weakness and headaches.   Endo/Heme/Allergies: Negative.  Does not bruise/bleed easily.   Psychiatric/Behavioral: Negative for depression. The patient is not nervous/anxious.    All other systems reviewed and are negative.     Physical Exam  Laboratory/Imaging   Hemodynamics  Temp (24hrs), Av.3 °C (99.1 °F), Min:36.8 °C (98.3 °F), Max:37.7 °C (99.9 °F)   Temperature: 36.8 °C (98.3 °F)  Pulse  Av.9  Min: 54  Max: 131    Blood Pressure: 108/71 mmHg      Respiratory      Respiration: 16, Pulse Oximetry: 95 %     Work Of Breathing / Effort: Mild  RUL Breath Sounds: Diminished, RML Breath Sounds: Diminished, RLL Breath Sounds: Fine Crackles, TRAVIS Breath Sounds: Clear, LLL Breath Sounds: Diminished    Fluids    Intake/Output Summary (Last 24 hours) at 17 1307  Last data filed at 17 0600   Gross per 24 hour   Intake   3060 ml   Output      0 ml   Net   3060 ml       Nutrition  Orders Placed This Encounter   Procedures   • Diet Order     Standing Status: Standing      Number of Occurrences: 1      Standing Expiration Date:      Order Specific Question:  Diet:     Answer:  Regular [1]     Physical  Exam   Constitutional: He appears well-developed and well-nourished. No distress.   HENT:   Nose: Nose normal.   Mouth/Throat: Oropharynx is clear and moist. No oropharyngeal exudate.   Eyes: Conjunctivae are normal. Right eye exhibits no discharge. Left eye exhibits no discharge. No scleral icterus.   Neck: No JVD present. No tracheal deviation present.   Cardiovascular: Normal rate, regular rhythm and normal heart sounds.    Pulmonary/Chest: Effort normal. No stridor. No respiratory distress. He has no wheezes. He has no rales. He exhibits no tenderness.   Abdominal: Soft. Bowel sounds are normal. He exhibits no distension. There is no tenderness.   Musculoskeletal: He exhibits no edema or tenderness.   Neurological: He is alert. No cranial nerve deficit. He exhibits normal muscle tone.   Skin: Skin is warm and dry. He is not diaphoretic. No pallor.   Psychiatric: He has a normal mood and affect. His behavior is normal.   Nursing note and vitals reviewed.      Recent Labs      06/21/17   0104  06/22/17   0601   WBC  21.4*  19.5*   RBC  4.80  4.11*   HEMOGLOBIN  13.5*  11.4*   HEMATOCRIT  38.8*  33.6*   MCV  80.8*  81.8   MCH  28.1  27.7   MCHC  34.8  33.9   RDW  45.7  45.6   PLATELETCT  279  247   MPV  8.5*  8.7*     Recent Labs      06/21/17   0104  06/22/17   0601   SODIUM  129*  135   POTASSIUM  3.2*  3.1*   CHLORIDE  95*  107   CO2  20  19*   GLUCOSE  101*  88   BUN  20  11   CREATININE  1.11  0.79   CALCIUM  8.6  8.4*     Recent Labs      06/21/17   0846   APTT  34.3   INR  1.20*     Recent Labs      06/21/17   0104   BNPBTYPENAT  49              Assessment/Plan     * Pneumonia (present on admission)  Assessment & Plan  Continue ceftriaxone and azithromycin  RT protocol  Oxygen as needed now on room air    Crohn's disease (CMS-HCC) (present on admission)  Assessment & Plan  By history  Uses medical marijuana at home to help with symptoms    Hyponatremia (present on admission)  Assessment & Plan  Improved with  fluids normal saline, now normal    Smoking (present on admission)  Assessment & Plan  Discussed cessation, offered nicotine replacement.    Sepsis (CMS-HCC) (present on admission)  Assessment & Plan  Protocol ordered on admission  Boluses prn  Improved on antibiotics  Follow cultures    Hypokalemia (present on admission)  Assessment & Plan  Recheck bmp today      Radiology images reviewed, EKG reviewed, Labs reviewed and Medications reviewed  Oneal catheter: No Oneal      DVT Prophylaxis: Heparin

## 2017-06-23 NOTE — PROGRESS NOTES
Patient awake at this time.  No reports of pain.  Requesting something for his cough, medicated per MAR.  Stated he was having difficulty sleeping.  Closed door and turned off neighbor's light.  Will continue to monitor.

## 2017-06-24 ENCOUNTER — PATIENT OUTREACH (OUTPATIENT)
Dept: HEALTH INFORMATION MANAGEMENT | Facility: OTHER | Age: 42
End: 2017-06-24

## 2017-06-24 VITALS
SYSTOLIC BLOOD PRESSURE: 120 MMHG | OXYGEN SATURATION: 100 % | DIASTOLIC BLOOD PRESSURE: 71 MMHG | HEART RATE: 57 BPM | HEIGHT: 67 IN | BODY MASS INDEX: 21.38 KG/M2 | WEIGHT: 136.24 LBS | TEMPERATURE: 98.7 F | RESPIRATION RATE: 18 BRPM

## 2017-06-24 PROCEDURE — 700111 HCHG RX REV CODE 636 W/ 250 OVERRIDE (IP): Performed by: INTERNAL MEDICINE

## 2017-06-24 PROCEDURE — A9270 NON-COVERED ITEM OR SERVICE: HCPCS | Performed by: INTERNAL MEDICINE

## 2017-06-24 PROCEDURE — A9270 NON-COVERED ITEM OR SERVICE: HCPCS

## 2017-06-24 PROCEDURE — 700102 HCHG RX REV CODE 250 W/ 637 OVERRIDE(OP): Performed by: HOSPITALIST

## 2017-06-24 PROCEDURE — 700102 HCHG RX REV CODE 250 W/ 637 OVERRIDE(OP): Performed by: INTERNAL MEDICINE

## 2017-06-24 PROCEDURE — 700102 HCHG RX REV CODE 250 W/ 637 OVERRIDE(OP)

## 2017-06-24 PROCEDURE — A9270 NON-COVERED ITEM OR SERVICE: HCPCS | Performed by: HOSPITALIST

## 2017-06-24 PROCEDURE — 700105 HCHG RX REV CODE 258: Performed by: INTERNAL MEDICINE

## 2017-06-24 PROCEDURE — 99239 HOSP IP/OBS DSCHRG MGMT >30: CPT | Performed by: HOSPITALIST

## 2017-06-24 RX ORDER — CEFDINIR 300 MG/1
300 CAPSULE ORAL EVERY 12 HOURS
Qty: 6 CAP | Refills: 0 | Status: SHIPPED | OUTPATIENT
Start: 2017-06-24 | End: 2017-06-24

## 2017-06-24 RX ORDER — CEFDINIR 300 MG/1
300 CAPSULE ORAL EVERY 12 HOURS
Status: DISCONTINUED | OUTPATIENT
Start: 2017-06-24 | End: 2017-06-24 | Stop reason: HOSPADM

## 2017-06-24 RX ORDER — AZITHROMYCIN 250 MG/1
TABLET, FILM COATED ORAL
Qty: 1 TAB | Refills: 0 | Status: SHIPPED | OUTPATIENT
Start: 2017-06-25 | End: 2017-06-24

## 2017-06-24 RX ORDER — AZITHROMYCIN 250 MG/1
TABLET, FILM COATED ORAL
Qty: 1 TAB | Refills: 0 | Status: SHIPPED | OUTPATIENT
Start: 2017-06-25 | End: 2017-06-26

## 2017-06-24 RX ORDER — CEFDINIR 300 MG/1
300 CAPSULE ORAL EVERY 12 HOURS
Qty: 6 CAP | Refills: 0 | Status: SHIPPED | OUTPATIENT
Start: 2017-06-24 | End: 2017-06-27

## 2017-06-24 RX ADMIN — SODIUM CHLORIDE: 9 INJECTION, SOLUTION INTRAVENOUS at 02:41

## 2017-06-24 RX ADMIN — SENNOSIDES AND DOCUSATE SODIUM 2 TABLET: 8.6; 5 TABLET ORAL at 08:18

## 2017-06-24 RX ADMIN — AZITHROMYCIN 500 MG: 250 TABLET, FILM COATED ORAL at 08:18

## 2017-06-24 RX ADMIN — POTASSIUM CHLORIDE 20 MEQ: 750 TABLET, FILM COATED, EXTENDED RELEASE ORAL at 08:18

## 2017-06-24 RX ADMIN — CEFDINIR 300 MG: 300 CAPSULE ORAL at 09:43

## 2017-06-24 RX ADMIN — HEPARIN SODIUM 5000 UNITS: 5000 INJECTION, SOLUTION INTRAVENOUS; SUBCUTANEOUS at 05:35

## 2017-06-24 ASSESSMENT — PAIN SCALES - GENERAL
PAINLEVEL_OUTOF10: 0
PAINLEVEL_OUTOF10: 0

## 2017-06-24 NOTE — PROGRESS NOTES
Received bedside report from ROBB Silva. Pt laying in bed with eyes closed, easily aroused. No c/o pain or discomfort. Pt ad marisol. Bed at low position. Call bell within patient reach. Will continue with plan of care.

## 2017-06-24 NOTE — CARE PLAN
Problem: Infection  Goal: Will remain free from infection  Outcome: PROGRESSING AS EXPECTED  Intervention: Assess signs and symptoms of infection  Patient vital signs and labs reviewed.  Current signs and symptoms of infection are improving.  Patient still has a low grade fever at this time.       Problem: Venous Thromboembolism (VTW)/Deep Vein Thrombosis (DVT) Prevention:  Goal: Patient will participate in Venous Thrombosis (VTE)/Deep Vein Thrombosis (DVT)Prevention Measures  Outcome: PROGRESSING AS EXPECTED  Patient is currently receiving unfractionated heparin at dvt prophylaxis.  Patient ambulate frequently as well.

## 2017-06-24 NOTE — PROGRESS NOTES
Bedside report completed.  Assumed pt care. Patient sitting up in bed, in no apparent distress.  Safety precautions in place. Call light & personal belongings within reach.  Plan of care discussed.  Patient is on room air, IV is running NS @ 125 mL/hour.  No reports of pain at this time.  Patient has not received dinner tray.  Let kitchen staff know and they are getting it.  No other needs expressed.  Will continue to monitor.

## 2017-06-24 NOTE — PROGRESS NOTES
Patient discharged as ordered. AVS including education, prescriptions and appointments reviewed with patient. He agrees to all and states understanding. Charge nurse ROBB Garcia verified prices of Zithromax and Cefdinir with [employer] and pt states that he can afford those prices quoted to him. No need for financial aid. Appointment made with [facility] via  on 7/5/17 @5247. Letter for work handed to patient. Pt off unit via transport wheelchair.

## 2017-06-24 NOTE — DISCHARGE INSTRUCTIONS
Discharge Instructions    Discharged to home by car with friend. Discharged via wheelchair, hospital escort: Refused.  Special equipment needed: Not Applicable    Be sure to schedule a follow-up appointment with your primary care doctor or any specialists as instructed.     Discharge Plan:   Smoking Cessation Offered: Patient Refused  Influenza Vaccine Indication: Patient Refuses    I understand that a diet low in cholesterol, fat, and sodium is recommended for good health. Unless I have been given specific instructions below for another diet, I accept this instruction as my diet prescription.   Other diet: regular    Special Instructions: None    · Is patient discharged on Warfarin / Coumadin?   No     · Is patient Post Blood Transfusion?  No    Pneumonia, Adult  Pneumonia is an infection of the lungs.   CAUSES  Pneumonia may be caused by bacteria or a virus. Usually, the infection is caused by breathing in droplets from an infected person's cough or sneeze.   SYMPTOMS   Symptoms of pneumonia include:  · Cough.  · Fever.  · Chest pain.  · Rapid breathing.  · Shortness of breath.  · Shaking chills.  · Mucus production.  DIAGNOSIS   If you have the common symptoms of pneumonia, often your health care provider will confirm the diagnosis with a chest X-ray. The X-ray will show an abnormality in the lung if you have pneumonia. Other tests may be done on your blood, urine, or mucus (sputum) to find the specific cause of your pneumonia. A blood gas test or pulse oximetry test may be needed to check how well your lungs are working.  TREATMENT   Your treatment will depend on whether your pneumonia is caused by bacteria or a virus.   · Bacterial pneumonia is treated with antibiotic medicine.  · Pneumonia that is caused by the influenza virus may be treated with an antiviral medicine.  · Pneumonia that is caused by a virus other than influenza will not respond to antibiotic medicine. This type of pneumonia will have to run  its course.   HOME CARE INSTRUCTIONS   · Cough suppressants may be used if you are losing too much rest from coughing at night. However, you should try to avoid taking cough suppresants. This is because coughing helps to remove mucus from your lungs.  · Sleep in a semi-upright position at night. Try sleeping in a reclining chair, or place a few pillows under your head.  · Try using a cold steam vaporizer or humidifier in your home or bedroom. This may help loosen your mucus.  · If you were prescribed an antibiotic medicine, finish all of it even if you start to feel better.  · If you were prescribed an expectorant, take it as directed by your health care provider. This medicine loosens the mucus so you can cough it up.  · Take medicines only as directed by your health care provider.  · Do not smoke. If you are a smoker and continue to smoke, your cough may last several weeks after your pneumonia has cleared.  · Get rest when you feel tired, or as needed.  PREVENTION  A pneumococcal shot (vaccine) is available to prevent a common bacterial cause of pneumonia. This is usually suggested for:  · People over 65 years old.  · People on chemotherapy.  · People with chronic lung problems, such as bronchitis or emphysema.  · People with immune system problems.  If you are over 65 years old or have a high risk condition, you may receive the pneumococcal vaccine if you have not received it before. In some countries, a routine influenza vaccine is also recommended. This vaccine can help prevent some cases of pneumonia. You may be offered the influenza vaccine as part of your care.  If you are a smoker, it is time to quit in order to prevent pneumonia in the future. You may receive instructions on how to stop smoking. Your health care provider can provide medicines and counseling to help you quit.  SEEK MEDICAL CARE IF:  · You have a fever.  · You cannot control your cough with suppressants at night, and you keep losing  sleep.  SEEK IMMEDIATE MEDICAL CARE IF:   · You have worsening shortness of breath.  · You have increased chest pain.  · Your sickness becomes worse, especially if you are an older adult or have a weakened immune system.  · You cough up blood.  · You have pain that is getting worse or is not controlled with medicines.  · Your symptoms are getting worse rather than better.     This information is not intended to replace advice given to you by your health care provider. Make sure you discuss any questions you have with your health care provider.     Document Released: 12/18/2006 Document Revised: 01/08/2016 Document Reviewed: 04/13/2016  [employer] Interactive Patient Education ©2016 [employer]        Depression / Suicide Risk    As you are discharged from this [facility] facility, it is important to learn how to keep safe from harming yourself.    Recognize the warning signs:  · Abrupt changes in personality, positive or negative- including increase in energy   · Giving away possessions  · Change in eating patterns- significant weight changes-  positive or negative  · Change in sleeping patterns- unable to sleep or sleeping all the time   · Unwillingness or inability to communicate  · Depression  · Unusual sadness, discouragement and loneliness  · Talk of wanting to die  · Neglect of personal appearance   · Rebelliousness- reckless behavior  · Withdrawal from people/activities they love  · Confusion- inability to concentrate     If you or a loved one observes any of these behaviors or has concerns about self-harm, here's what you can do:  · Talk about it- your feelings and reasons for harming yourself  · Remove any means that you might use to hurt yourself (examples: pills, rope, extension cords, firearm)  · Get professional help from the community (Mental Health, Substance Abuse, psychological counseling)  · Do not be alone:Call your Safe Contact- someone whom you trust who will be there for you.  · Call your  local CRISIS HOTLINE 887-7457 or 106-658-1256  · Call your local Children's Mobile Crisis Response Team [place] (353) 087-1549 or www.Finisar  · Call the toll free National Suicide Prevention Hotlines   · National Suicide Prevention Lifeline 947-716-ICZD (1778)  · National [employer] Line Network 800-SUICIDE (986-5637)

## 2017-06-25 NOTE — DISCHARGE SUMMARY
CHIEF COMPLAINT:  Cough and shortness of breath.    DISCHARGE DIAGNOSES:  1.  Community-acquired pneumonia.  2.  Sepsis, resolved, secondary to above.  3.  Hypokalemia, corrected.  4.  Hyponatremia, improved.  5.  Chronic Crohn's disease.  6.  Tobacco abuse.    HOSPITAL COURSE:  For a complete history and physical, please see notes   dictated by Dr. Orlin Paul.  In short, this is a pleasant 41-year-old   gentleman with the history of Crohn's disease and also tobacco abuse who came   in with complaints of cough, shortness of breath and malaise.  Upon assessment   in the ER, he was noted to have significant leukocytosis with lactic acidosis   and elevated D-dimer.  CT of the chest showed multifocal pneumonia primarily   involving the right lower lobe.  No filling defects were noted.  Because of   this, he was admitted to the hospital and started on IV antibiotic therapy.    Blood cultures were obtained and have remained negative since the 21st of June.  By the date of discharge, the patient had stable vital signs, was   afebrile and was stable on room air.  He was transitioned from IV antibiotics   to oral antibiotics, which he tolerated.  At this point, he is stable for   discharge home.  He does not have a primary care physician; therefore, he has   been given information on the [facility] in [facility] with strict   instructions to establish care plus he has a [facility] followup on July 5 which   he was instructed to keep.      DISPOSITION:  Home.    DIET:  Regular.    ACTIVITY:  As tolerated.    MEDICATIONS:  1.  Azithromycin 250 mg p.o. everyday x2 more days to complete the full 5-day   course.  2.  Cefdinir 300 mg p.o. q. 12 hours x3 days to complete a full 7-day course.    FOLLOWUP:  The patient has been scheduled to see RAMONE Almonte at 8:30 on   July 5th.  He is instructed to keep this appointment.    These discharge instructions were discussed with the patient at the bedside,   he has  expressed understanding and agreed to comply with all discharge   instructions.    TOTAL TIME SPENT:  Thirty-six minutes were spent in the discharge planning and   management of this patient, including more than 50% of the time spent   face-to-face in counseling.       ____________________________________     MD JORGE A LACY / NATALI    DD:  06/24/2017 16:46:28  DT:  06/25/2017 03:35:42    D#:  [id number]  Job#:  [id number]

## 2017-06-26 LAB
BACTERIA BLD CULT: NORMAL
BACTERIA BLD CULT: NORMAL
SIGNIFICANT IND 70042: NORMAL
SIGNIFICANT IND 70042: NORMAL
SITE SITE: NORMAL
SITE SITE: NORMAL
SOURCE SOURCE: NORMAL
SOURCE SOURCE: NORMAL

## 2018-05-09 ENCOUNTER — HOSPITAL ENCOUNTER (EMERGENCY)
Facility: MEDICAL CENTER | Age: 43
End: 2018-05-10
Attending: EMERGENCY MEDICINE

## 2018-05-09 DIAGNOSIS — R53.1 WEAKNESS: ICD-10-CM

## 2018-05-09 PROCEDURE — 99284 EMERGENCY DEPT VISIT MOD MDM: CPT

## 2018-05-10 VITALS
HEIGHT: 66 IN | OXYGEN SATURATION: 98 % | HEART RATE: 56 BPM | WEIGHT: 146.39 LBS | TEMPERATURE: 97.6 F | SYSTOLIC BLOOD PRESSURE: 110 MMHG | RESPIRATION RATE: 16 BRPM | DIASTOLIC BLOOD PRESSURE: 62 MMHG | BODY MASS INDEX: 23.53 KG/M2

## 2018-05-10 LAB
ANION GAP SERPL CALC-SCNC: 8 MMOL/L (ref 0–11.9)
BASOPHILS # BLD AUTO: 0.7 % (ref 0–1.8)
BASOPHILS # BLD: 0.06 K/UL (ref 0–0.12)
BUN SERPL-MCNC: 16 MG/DL (ref 8–22)
CALCIUM SERPL-MCNC: 8.9 MG/DL (ref 8.5–10.5)
CHLORIDE SERPL-SCNC: 106 MMOL/L (ref 96–112)
CO2 SERPL-SCNC: 23 MMOL/L (ref 20–33)
CREAT SERPL-MCNC: 0.87 MG/DL (ref 0.5–1.4)
EKG IMPRESSION: NORMAL
EOSINOPHIL # BLD AUTO: 0.12 K/UL (ref 0–0.51)
EOSINOPHIL NFR BLD: 1.3 % (ref 0–6.9)
ERYTHROCYTE [DISTWIDTH] IN BLOOD BY AUTOMATED COUNT: 49.5 FL (ref 35.9–50)
GLUCOSE SERPL-MCNC: 84 MG/DL (ref 65–99)
HCT VFR BLD AUTO: 44 % (ref 42–52)
HGB BLD-MCNC: 14.2 G/DL (ref 14–18)
IMM GRANULOCYTES # BLD AUTO: 0.02 K/UL (ref 0–0.11)
IMM GRANULOCYTES NFR BLD AUTO: 0.2 % (ref 0–0.9)
LYMPHOCYTES # BLD AUTO: 2.2 K/UL (ref 1–4.8)
LYMPHOCYTES NFR BLD: 24.7 % (ref 22–41)
MCH RBC QN AUTO: 28.6 PG (ref 27–33)
MCHC RBC AUTO-ENTMCNC: 32.3 G/DL (ref 33.7–35.3)
MCV RBC AUTO: 88.5 FL (ref 81.4–97.8)
MONOCYTES # BLD AUTO: 0.67 K/UL (ref 0–0.85)
MONOCYTES NFR BLD AUTO: 7.5 % (ref 0–13.4)
NEUTROPHILS # BLD AUTO: 5.83 K/UL (ref 1.82–7.42)
NEUTROPHILS NFR BLD: 65.6 % (ref 44–72)
NRBC # BLD AUTO: 0 K/UL
NRBC BLD-RTO: 0 /100 WBC
PLATELET # BLD AUTO: 298 K/UL (ref 164–446)
PMV BLD AUTO: 8.2 FL (ref 9–12.9)
POTASSIUM SERPL-SCNC: 3.7 MMOL/L (ref 3.6–5.5)
RBC # BLD AUTO: 4.97 M/UL (ref 4.7–6.1)
SODIUM SERPL-SCNC: 137 MMOL/L (ref 135–145)
WBC # BLD AUTO: 8.9 K/UL (ref 4.8–10.8)

## 2018-05-10 PROCEDURE — 96360 HYDRATION IV INFUSION INIT: CPT

## 2018-05-10 PROCEDURE — 85025 COMPLETE CBC W/AUTO DIFF WBC: CPT

## 2018-05-10 PROCEDURE — 700105 HCHG RX REV CODE 258: Performed by: EMERGENCY MEDICINE

## 2018-05-10 PROCEDURE — 80048 BASIC METABOLIC PNL TOTAL CA: CPT

## 2018-05-10 PROCEDURE — 93005 ELECTROCARDIOGRAM TRACING: CPT | Performed by: EMERGENCY MEDICINE

## 2018-05-10 PROCEDURE — 36415 COLL VENOUS BLD VENIPUNCTURE: CPT

## 2018-05-10 RX ORDER — SODIUM CHLORIDE 9 MG/ML
1000 INJECTION, SOLUTION INTRAVENOUS ONCE
Status: COMPLETED | OUTPATIENT
Start: 2018-05-10 | End: 2018-05-10

## 2018-05-10 RX ADMIN — SODIUM CHLORIDE 1000 ML: 9 INJECTION, SOLUTION INTRAVENOUS at 00:43

## 2018-05-10 NOTE — ED NOTES
Pt Given discharge instructions/ home care instructions, Pt verbalized understanding of instructions given, pt ambulatory to AVA jimenes.

## 2018-05-10 NOTE — ED PROVIDER NOTES
"ER Provider Note     Scribed for Jayesh Oglesby M.D. by Yoav Warren. 5/10/2018, 12:14 AM.    Primary Care Provider: Pcp Pt States None  Means of Arrival: Walk in   History obtained from: Patient  History limited by: None     CHIEF COMPLAINT  Chief Complaint   Patient presents with   • Dizziness     pt states, \"I have been getting dizzy as the day goes on, and I feel like throwing up.\" started a few days ago. hx of crohn's disease   • Nausea   • Weakness       HPI  Michael Paul is a 42 y.o. male with history of Crohn's disease who presents to the Emergency Department for generalized weakness and nausea onset 3 days ago. The patient additionally endorses intermittent diarrhea. He also claims to have developed dizziness as the day has progressed today, but has no focal deficits at this time.  The patient reports to have experienced similar symptoms prior when he had a viral process. He states this does not feel like a Crohn's flare up. The patient is negative for vomiting, chest pain, fever, neck pain, or cough. No alleviating or exacerbating factors are identified at this time.     REVIEW OF SYSTEMS  See HPI for further details. All other systems are negative.   C.    PAST MEDICAL HISTORY   has a past medical history of Crohn's colitis (CMS-HCC).    SURGICAL HISTORY   has a past surgical history that includes sigmoidoscopy flexible with biopsy (11/29/2013) and irrigation & debridement general (Right, 7/17/2015).    SOCIAL HISTORY  Social History   Substance Use Topics   • Smoking status: Current Every Day Smoker     Packs/day: 0.50     Years: 15.00     Types: Cigarettes     Last attempt to quit: 8/24/2016   • Smokeless tobacco: Never Used   • Alcohol use Yes      Comment: occasional      History   Drug Use   • Types: Inhaled     Comment: jazmyne daily       FAMILY HISTORY  Family History   Problem Relation Age of Onset   • No Known Problems Mother    • No Known Problems Father        CURRENT " "MEDICATIONS  Home Medications    **Home medications have not yet been reviewed for this encounter**         ALLERGIES  Allergies   Allergen Reactions   • Prednisone Hives       PHYSICAL EXAM  VITAL SIGNS: /67   Pulse (!) 58   Temp 36.4 °C (97.6 °F)   Resp 18   Ht 1.676 m (5' 6\")   Wt 66.4 kg (146 lb 6.2 oz)   SpO2 98%   BMI 23.63 kg/m²      Constitutional: Slightly tired appearing.   HENT: No signs of trauma, Bilateral external ears normal, Nose normal.   Eyes: Pupils are equal and reactive, Conjunctiva normal, Non-icteric.   Neck: Normal range of motion, No tenderness, Supple, No stridor.   Lymphatic: No lymphadenopathy noted.   Cardiovascular: Regular rate and rhythm, no murmurs.   Thorax & Lungs: Normal breath sounds, No respiratory distress, No wheezing, No chest tenderness.   Abdomen: Bowel sounds normal, Soft, No tenderness, No masses, No pulsatile masses. No peritoneal signs.  Skin: Warm, Dry, No erythema, No rash.   Back: No bony tenderness, No CVA tenderness.   Extremities: Intact distal pulses, No edema, No tenderness, No cyanosis.  Musculoskeletal: Good range of motion in all major joints. No tenderness to palpation or major deformities noted.   Neurologic: Alert , Normal motor function, Normal sensory function, No focal deficits noted. The patient has a normal finger-nose-finger as well as negative Romberg. Patient is able to stand on toes bilaterally and has 5 out of 5 strength in  as well as pushes and pulls in her upper extremity. Sensation is intact to light touch in all extremities. The patient has no dysmetria. The patient has normal cranial nerves III through XII.  Psychiatric: Affect normal, Judgment normal, Mood normal.     DIAGNOSTIC STUDIES / PROCEDURES    EKG Interpretation:  Interpreted by me    12 Lead EKG interpreted by me to show:  Sinus Bradycardia  Rate 54  Axis: Normal  Intervals: Normal  Normal T waves  Normal ST segments  My impression of this EKG: Does not indicate " ischemia or arrhythmia at this time.     LABS  Labs Reviewed   CBC WITH DIFFERENTIAL - Abnormal; Notable for the following:        Result Value    MCHC 32.3 (*)     MPV 8.2 (*)     All other components within normal limits   BASIC METABOLIC PANEL   ESTIMATED GFR     All labs reviewed by me.    COURSE & MEDICAL DECISION MAKING  Pertinent Labs & Imaging studies reviewed. (See chart for details)    This is a 42 y.o. male that presents who presents with nonspecific signs and symptoms of lightheadedness and feeling weak. He has a nonfocal neurologic exam. I will order the below tests to evaluate the patient. He is no signs or symptoms of meningitis. This could be viral in origin. Could also be electrolyte derangement or anemia..     12:14 AM - Patient seen and examined at bedside. Ordered BMP, CBC, and EKG.  Patient will be medicated with 1 L NS for dry mucous membranes with concern of dehydration.      1:14 AM - Reviewed patient's lab results as shown above.     1:18 AM - Patient reevaluated at bedside. He has improvement of mucous membranes after IV fluids. Patient informed of lab results. He was made aware he will be discharged home. Patient agreeable.     Patient was found to have a nonischemic EKG. The patient has no electrolyte derangements. The patient is feeling much improved after IV fluids. At this point I will discharge the patient home with follow-up with her primary care physician and strict return precautions were given.    The patient will return for new or worsening symptoms and is stable at the time of discharge.    DISPOSITION:  Patient will be discharged home in stable condition.    FOLLOW UP:  [facility]  [facility]  [facility] [facility] [address]  [phone]    In 2 days        OUTPATIENT MEDICATIONS:  New Prescriptions    No medications on file         FINAL IMPRESSION  1. Weakness          Yoav SCHWAB (Scribe), am scribing for, and in the presence of, Jayesh Oglesby M.D..    Electronically signed  by: Yoav Warren (Scribe), 5/10/2018    IJayesh M.D. personally performed the services described in this documentation, as scribed by Yoav Warren in my presence, and it is both accurate and complete.     The note accurately reflects work and decisions made by me.  Jayesh Oglesby  5/10/2018  1:30 AM

## 2018-05-10 NOTE — DISCHARGE INSTRUCTIONS
Near-Syncope  Introduction  Near-syncope is when you suddenly get weak or dizzy, or you feel like you might pass out (faint). During an episode of near-syncope, you may:  · Feel dizzy or light-headed.  · Feel sick to your stomach (nauseous).  · See all white or all black.  · Have cold, clammy skin.  If you passed out, get help right away.Call your local emergency services (601 in the [place] Do not drive yourself to the hospital.  Follow these instructions at home:  Pay attention to any changes in your symptoms. Take these actions to help with your condition:  · Have someone stay with you until you feel stable.  · Do not drive, use machinery, or play sports until your doctor says it is okay.  · Keep all follow-up visits as told by your doctor. This is important.  · If you start to feel like you might pass out, lie down right away and raise (elevate) your feet above the level of your heart. Breathe deeply and steadily. Wait until all of the symptoms are gone.  · Drink enough fluid to keep your pee (urine) clear or pale yellow.  · If you are taking blood pressure or heart medicine, get up slowly and spend many minutes getting ready to sit and then stand. This can help with dizziness.  · Take over-the-counter and prescription medicines only as told by your doctor.  Get help right away if:  · You have a very bad headache.  · You have unusual pain in your chest, tummy, or back.  · You are bleeding from your mouth or rectum.  · You have black or tarry poop (stool).  · You have a very fast or uneven heartbeat (palpitations).  · You pass out one time or more than once.  · You have jerky movements that you cannot control (seizure).  · You are confused.  · You have trouble walking.  · You are very weak.  · You have vision problems.  These symptoms may be an emergency. Do not wait to see if the symptoms will go away. Get medical help right away. Call your local emergency services (631 in the [place] Do not drive yourself to the  [facility].   This information is not intended to replace advice given to you by your health care provider. Make sure you discuss any questions you have with your health care provider.  Document Released: 06/05/2009 Document Revised: 05/25/2017 Document Reviewed: 08/31/2016  © 2017 [employer]

## 2018-05-10 NOTE — ED TRIAGE NOTES
"Chief Complaint   Patient presents with   • Dizziness     pt states, \"I have been getting dizzy as the day goes on, and I feel like throwing up.\" started a few days ago. hx of crohn's disease   • Nausea   • Weakness       Pt ambulatory to triage with above complaint, VSS with bradycardia noted, pt educated on triage process, placed back in lobby, pt instructed to notify staff of any issues.     Blood pressure 114/67, pulse (!) 58, temperature 36.4 °C (97.6 °F), resp. rate 18, height 1.676 m (5' 6\"), weight 66.4 kg (146 lb 6.2 oz), SpO2 98 %.    "

## 2018-10-12 ENCOUNTER — HOSPITAL ENCOUNTER (INPATIENT)
Facility: MEDICAL CENTER | Age: 43
LOS: 2 days | DRG: 387 | End: 2018-10-14
Attending: EMERGENCY MEDICINE | Admitting: INTERNAL MEDICINE

## 2018-10-12 ENCOUNTER — APPOINTMENT (OUTPATIENT)
Dept: RADIOLOGY | Facility: MEDICAL CENTER | Age: 43
DRG: 387 | End: 2018-10-12
Attending: EMERGENCY MEDICINE

## 2018-10-12 DIAGNOSIS — K50.111 CROHN'S DISEASE OF COLON WITH RECTAL BLEEDING (HCC): ICD-10-CM

## 2018-10-12 DIAGNOSIS — K52.9 COLITIS, ACUTE: ICD-10-CM

## 2018-10-12 LAB
ABO GROUP BLD: NORMAL
ALBUMIN SERPL BCP-MCNC: 4.3 G/DL (ref 3.2–4.9)
ALBUMIN/GLOB SERPL: 1 G/DL
ALP SERPL-CCNC: 93 U/L (ref 30–99)
ALT SERPL-CCNC: 14 U/L (ref 2–50)
ANION GAP SERPL CALC-SCNC: 10 MMOL/L (ref 0–11.9)
APPEARANCE UR: CLEAR
APTT PPP: 32.5 SEC (ref 24.7–36)
AST SERPL-CCNC: 19 U/L (ref 12–45)
BASOPHILS # BLD AUTO: 0.5 % (ref 0–1.8)
BASOPHILS # BLD: 0.04 K/UL (ref 0–0.12)
BILIRUB SERPL-MCNC: 0.3 MG/DL (ref 0.1–1.5)
BILIRUB UR QL STRIP.AUTO: NEGATIVE
BLD GP AB SCN SERPL QL: NORMAL
BUN SERPL-MCNC: 18 MG/DL (ref 8–22)
CALCIUM SERPL-MCNC: 9.3 MG/DL (ref 8.5–10.5)
CHLORIDE SERPL-SCNC: 100 MMOL/L (ref 96–112)
CO2 SERPL-SCNC: 22 MMOL/L (ref 20–33)
COLOR UR: YELLOW
CREAT SERPL-MCNC: 0.9 MG/DL (ref 0.5–1.4)
CRP SERPL HS-MCNC: 4.93 MG/DL (ref 0–0.75)
EOSINOPHIL # BLD AUTO: 0.13 K/UL (ref 0–0.51)
EOSINOPHIL NFR BLD: 1.6 % (ref 0–6.9)
ERYTHROCYTE [DISTWIDTH] IN BLOOD BY AUTOMATED COUNT: 45.8 FL (ref 35.9–50)
ERYTHROCYTE [SEDIMENTATION RATE] IN BLOOD BY WESTERGREN METHOD: 62 MM/HOUR (ref 0–15)
GLOBULIN SER CALC-MCNC: 4.3 G/DL (ref 1.9–3.5)
GLUCOSE SERPL-MCNC: 84 MG/DL (ref 65–99)
GLUCOSE UR STRIP.AUTO-MCNC: NEGATIVE MG/DL
HCT VFR BLD AUTO: 41.3 % (ref 42–52)
HGB BLD-MCNC: 13.6 G/DL (ref 14–18)
IMM GRANULOCYTES # BLD AUTO: 0.02 K/UL (ref 0–0.11)
IMM GRANULOCYTES NFR BLD AUTO: 0.2 % (ref 0–0.9)
INR PPP: 1.01 (ref 0.87–1.13)
KETONES UR STRIP.AUTO-MCNC: ABNORMAL MG/DL
LEUKOCYTE ESTERASE UR QL STRIP.AUTO: NEGATIVE
LIPASE SERPL-CCNC: 16 U/L (ref 11–82)
LYMPHOCYTES # BLD AUTO: 1.95 K/UL (ref 1–4.8)
LYMPHOCYTES NFR BLD: 24.1 % (ref 22–41)
MCH RBC QN AUTO: 29.6 PG (ref 27–33)
MCHC RBC AUTO-ENTMCNC: 32.9 G/DL (ref 33.7–35.3)
MCV RBC AUTO: 89.8 FL (ref 81.4–97.8)
MICRO URNS: ABNORMAL
MONOCYTES # BLD AUTO: 0.99 K/UL (ref 0–0.85)
MONOCYTES NFR BLD AUTO: 12.2 % (ref 0–13.4)
MUCOUS THREADS #/AREA URNS HPF: ABNORMAL /HPF
NEUTROPHILS # BLD AUTO: 4.96 K/UL (ref 1.82–7.42)
NEUTROPHILS NFR BLD: 61.4 % (ref 44–72)
NITRITE UR QL STRIP.AUTO: NEGATIVE
NRBC # BLD AUTO: 0 K/UL
NRBC BLD-RTO: 0 /100 WBC
PH UR STRIP.AUTO: 6 [PH]
PLATELET # BLD AUTO: 409 K/UL (ref 164–446)
PMV BLD AUTO: 8.2 FL (ref 9–12.9)
POTASSIUM SERPL-SCNC: 3.8 MMOL/L (ref 3.6–5.5)
PROT SERPL-MCNC: 8.6 G/DL (ref 6–8.2)
PROT UR QL STRIP: NEGATIVE MG/DL
PROTHROMBIN TIME: 13.4 SEC (ref 12–14.6)
RBC # BLD AUTO: 4.6 M/UL (ref 4.7–6.1)
RBC # URNS HPF: ABNORMAL /HPF
RBC UR QL AUTO: ABNORMAL
RH BLD: NORMAL
SODIUM SERPL-SCNC: 132 MMOL/L (ref 135–145)
SP GR UR STRIP.AUTO: 1.02
UROBILINOGEN UR STRIP.AUTO-MCNC: 0.2 MG/DL
WBC # BLD AUTO: 8.1 K/UL (ref 4.8–10.8)

## 2018-10-12 PROCEDURE — 96368 THER/DIAG CONCURRENT INF: CPT

## 2018-10-12 PROCEDURE — 36415 COLL VENOUS BLD VENIPUNCTURE: CPT

## 2018-10-12 PROCEDURE — 700117 HCHG RX CONTRAST REV CODE 255: Performed by: EMERGENCY MEDICINE

## 2018-10-12 PROCEDURE — 74177 CT ABD & PELVIS W/CONTRAST: CPT

## 2018-10-12 PROCEDURE — 86901 BLOOD TYPING SEROLOGIC RH(D): CPT

## 2018-10-12 PROCEDURE — 85025 COMPLETE CBC W/AUTO DIFF WBC: CPT

## 2018-10-12 PROCEDURE — 96375 TX/PRO/DX INJ NEW DRUG ADDON: CPT

## 2018-10-12 PROCEDURE — 86850 RBC ANTIBODY SCREEN: CPT

## 2018-10-12 PROCEDURE — 770006 HCHG ROOM/CARE - MED/SURG/GYN SEMI*

## 2018-10-12 PROCEDURE — 83690 ASSAY OF LIPASE: CPT

## 2018-10-12 PROCEDURE — 80053 COMPREHEN METABOLIC PANEL: CPT

## 2018-10-12 PROCEDURE — 99285 EMERGENCY DEPT VISIT HI MDM: CPT

## 2018-10-12 PROCEDURE — 700101 HCHG RX REV CODE 250: Performed by: EMERGENCY MEDICINE

## 2018-10-12 PROCEDURE — 85610 PROTHROMBIN TIME: CPT

## 2018-10-12 PROCEDURE — 86900 BLOOD TYPING SEROLOGIC ABO: CPT

## 2018-10-12 PROCEDURE — 86140 C-REACTIVE PROTEIN: CPT

## 2018-10-12 PROCEDURE — 700105 HCHG RX REV CODE 258: Performed by: STUDENT IN AN ORGANIZED HEALTH CARE EDUCATION/TRAINING PROGRAM

## 2018-10-12 PROCEDURE — 85652 RBC SED RATE AUTOMATED: CPT

## 2018-10-12 PROCEDURE — 85730 THROMBOPLASTIN TIME PARTIAL: CPT

## 2018-10-12 PROCEDURE — 700105 HCHG RX REV CODE 258: Performed by: EMERGENCY MEDICINE

## 2018-10-12 PROCEDURE — 81001 URINALYSIS AUTO W/SCOPE: CPT

## 2018-10-12 PROCEDURE — 700111 HCHG RX REV CODE 636 W/ 250 OVERRIDE (IP): Performed by: EMERGENCY MEDICINE

## 2018-10-12 PROCEDURE — 96365 THER/PROPH/DIAG IV INF INIT: CPT

## 2018-10-12 RX ORDER — METHYLPREDNISOLONE SODIUM SUCCINATE 125 MG/2ML
125 INJECTION, POWDER, LYOPHILIZED, FOR SOLUTION INTRAMUSCULAR; INTRAVENOUS ONCE
Status: COMPLETED | OUTPATIENT
Start: 2018-10-12 | End: 2018-10-12

## 2018-10-12 RX ORDER — SODIUM CHLORIDE 9 MG/ML
1000 INJECTION, SOLUTION INTRAVENOUS ONCE
Status: COMPLETED | OUTPATIENT
Start: 2018-10-12 | End: 2018-10-12

## 2018-10-12 RX ORDER — ONDANSETRON 2 MG/ML
4 INJECTION INTRAMUSCULAR; INTRAVENOUS ONCE
Status: COMPLETED | OUTPATIENT
Start: 2018-10-12 | End: 2018-10-12

## 2018-10-12 RX ORDER — BISACODYL 10 MG
10 SUPPOSITORY, RECTAL RECTAL
Status: DISCONTINUED | OUTPATIENT
Start: 2018-10-12 | End: 2018-10-13

## 2018-10-12 RX ORDER — NICOTINE 21 MG/24HR
14 PATCH, TRANSDERMAL 24 HOURS TRANSDERMAL
Status: DISCONTINUED | OUTPATIENT
Start: 2018-10-13 | End: 2018-10-14 | Stop reason: HOSPADM

## 2018-10-12 RX ORDER — AMOXICILLIN 250 MG
2 CAPSULE ORAL 2 TIMES DAILY
Status: DISCONTINUED | OUTPATIENT
Start: 2018-10-13 | End: 2018-10-13

## 2018-10-12 RX ORDER — PANTOPRAZOLE SODIUM 40 MG/10ML
40 INJECTION, POWDER, LYOPHILIZED, FOR SOLUTION INTRAVENOUS 2 TIMES DAILY
Status: DISCONTINUED | OUTPATIENT
Start: 2018-10-12 | End: 2018-10-13

## 2018-10-12 RX ORDER — SODIUM CHLORIDE 9 MG/ML
INJECTION, SOLUTION INTRAVENOUS CONTINUOUS
Status: DISCONTINUED | OUTPATIENT
Start: 2018-10-12 | End: 2018-10-14

## 2018-10-12 RX ORDER — ACETAMINOPHEN 325 MG/1
650 TABLET ORAL EVERY 6 HOURS PRN
Status: DISCONTINUED | OUTPATIENT
Start: 2018-10-12 | End: 2018-10-14 | Stop reason: HOSPADM

## 2018-10-12 RX ORDER — MORPHINE SULFATE 4 MG/ML
4 INJECTION, SOLUTION INTRAMUSCULAR; INTRAVENOUS
Status: COMPLETED | OUTPATIENT
Start: 2018-10-12 | End: 2018-10-12

## 2018-10-12 RX ORDER — ACETAMINOPHEN 500 MG
1000 TABLET ORAL 2 TIMES DAILY
COMMUNITY

## 2018-10-12 RX ORDER — CLONIDINE HYDROCHLORIDE 0.1 MG/1
0.1 TABLET ORAL EVERY 6 HOURS PRN
Status: DISCONTINUED | OUTPATIENT
Start: 2018-10-12 | End: 2018-10-14

## 2018-10-12 RX ORDER — CIPROFLOXACIN 2 MG/ML
400 INJECTION, SOLUTION INTRAVENOUS ONCE
Status: COMPLETED | OUTPATIENT
Start: 2018-10-12 | End: 2018-10-12

## 2018-10-12 RX ORDER — POLYETHYLENE GLYCOL 3350 17 G/17G
1 POWDER, FOR SOLUTION ORAL
Status: DISCONTINUED | OUTPATIENT
Start: 2018-10-12 | End: 2018-10-13

## 2018-10-12 RX ADMIN — METRONIDAZOLE 500 MG: 500 INJECTION, SOLUTION INTRAVENOUS at 17:16

## 2018-10-12 RX ADMIN — MORPHINE SULFATE 4 MG: 4 INJECTION INTRAVENOUS at 21:26

## 2018-10-12 RX ADMIN — METHYLPREDNISOLONE SODIUM SUCCINATE 125 MG: 125 INJECTION, POWDER, FOR SOLUTION INTRAMUSCULAR; INTRAVENOUS at 17:16

## 2018-10-12 RX ADMIN — ONDANSETRON 4 MG: 2 INJECTION INTRAMUSCULAR; INTRAVENOUS at 14:47

## 2018-10-12 RX ADMIN — SODIUM CHLORIDE: 9 INJECTION, SOLUTION INTRAVENOUS at 21:26

## 2018-10-12 RX ADMIN — IOHEXOL 100 ML: 350 INJECTION, SOLUTION INTRAVENOUS at 15:30

## 2018-10-12 RX ADMIN — SODIUM CHLORIDE 1000 ML: 9 INJECTION, SOLUTION INTRAVENOUS at 14:30

## 2018-10-12 RX ADMIN — MORPHINE SULFATE 4 MG: 4 INJECTION INTRAVENOUS at 14:47

## 2018-10-12 RX ADMIN — CIPROFLOXACIN 400 MG: 2 INJECTION, SOLUTION INTRAVENOUS at 18:00

## 2018-10-12 ASSESSMENT — COPD QUESTIONNAIRES
DO YOU EVER COUGH UP ANY MUCUS OR PHLEGM?: NO/ONLY WITH OCCASIONAL COLDS OR INFECTIONS
IN THE PAST 12 MONTHS DO YOU DO LESS THAN YOU USED TO BECAUSE OF YOUR BREATHING PROBLEMS: DISAGREE/UNSURE
COPD SCREENING SCORE: 0
HAVE YOU SMOKED AT LEAST 100 CIGARETTES IN YOUR ENTIRE LIFE: NO/DON'T KNOW
DURING THE PAST 4 WEEKS HOW MUCH DID YOU FEEL SHORT OF BREATH: NONE/LITTLE OF THE TIME

## 2018-10-12 ASSESSMENT — COGNITIVE AND FUNCTIONAL STATUS - GENERAL
DAILY ACTIVITIY SCORE: 24
SUGGESTED CMS G CODE MODIFIER DAILY ACTIVITY: CH
MOBILITY SCORE: 24
SUGGESTED CMS G CODE MODIFIER MOBILITY: CH

## 2018-10-12 ASSESSMENT — LIFESTYLE VARIABLES
TOTAL SCORE: 0
EVER_SMOKED: YES
HOW MANY TIMES IN THE PAST YEAR HAVE YOU HAD 5 OR MORE DRINKS IN A DAY: 0
ON A TYPICAL DAY WHEN YOU DRINK ALCOHOL HOW MANY DRINKS DO YOU HAVE: 2
EVER FELT BAD OR GUILTY ABOUT YOUR DRINKING: NO
DOES PATIENT WANT TO STOP DRINKING: NO
AVERAGE NUMBER OF DAYS PER WEEK YOU HAVE A DRINK CONTAINING ALCOHOL: 1
TOTAL SCORE: 2
TOTAL SCORE: 2
CONSUMPTION TOTAL: POSITIVE
TOTAL SCORE: 2
HAVE PEOPLE ANNOYED YOU BY CRITICIZING YOUR DRINKING: NO
DO YOU DRINK ALCOHOL: YES
EVER HAD A DRINK FIRST THING IN THE MORNING TO STEADY YOUR NERVES TO GET RID OF A HANGOVER: NO
DOES PATIENT WANT TO TALK TO SOMEONE ABOUT QUITTING: NO
ON A TYPICAL DAY WHEN YOU DRINK ALCOHOL HOW MANY DRINKS DO YOU HAVE: 2
HAVE YOU EVER FELT YOU SHOULD CUT DOWN ON YOUR DRINKING: NO
HAVE PEOPLE ANNOYED YOU BY CRITICIZING YOUR DRINKING: NO
HOW MANY TIMES IN THE PAST YEAR HAVE YOU HAD 5 OR MORE DRINKS IN A DAY: 0
HAVE YOU EVER FELT YOU SHOULD CUT DOWN ON YOUR DRINKING: NO
CONSUMPTION TOTAL: NEGATIVE
EVER HAD A DRINK FIRST THING IN THE MORNING TO STEADY YOUR NERVES TO GET RID OF A HANGOVER: YES
TOTAL SCORE: 0
AVERAGE NUMBER OF DAYS PER WEEK YOU HAVE A DRINK CONTAINING ALCOHOL: 1
TOTAL SCORE: 0
EVER FELT BAD OR GUILTY ABOUT YOUR DRINKING: YES

## 2018-10-12 ASSESSMENT — PATIENT HEALTH QUESTIONNAIRE - PHQ9
8. MOVING OR SPEAKING SO SLOWLY THAT OTHER PEOPLE COULD HAVE NOTICED. OR THE OPPOSITE, BEING SO FIGETY OR RESTLESS THAT YOU HAVE BEEN MOVING AROUND A LOT MORE THAN USUAL: NOT AT ALL
SUM OF ALL RESPONSES TO PHQ9 QUESTIONS 1 AND 2: 2
2. FEELING DOWN, DEPRESSED, IRRITABLE, OR HOPELESS: SEVERAL DAYS
5. POOR APPETITE OR OVEREATING: MORE THAN HALF THE DAYS
SUM OF ALL RESPONSES TO PHQ QUESTIONS 1-9: 8
3. TROUBLE FALLING OR STAYING ASLEEP OR SLEEPING TOO MUCH: NEARLY EVERY DAY
1. LITTLE INTEREST OR PLEASURE IN DOING THINGS: SEVERAL DAYS
6. FEELING BAD ABOUT YOURSELF - OR THAT YOU ARE A FAILURE OR HAVE LET YOURSELF OR YOUR FAMILY DOWN: NOT AL ALL
4. FEELING TIRED OR HAVING LITTLE ENERGY: SEVERAL DAYS
7. TROUBLE CONCENTRATING ON THINGS, SUCH AS READING THE NEWSPAPER OR WATCHING TELEVISION: NOT AT ALL
9. THOUGHTS THAT YOU WOULD BE BETTER OFF DEAD, OR OF HURTING YOURSELF: NOT AT ALL

## 2018-10-12 ASSESSMENT — PAIN SCALES - GENERAL
PAINLEVEL_OUTOF10: 7
PAINLEVEL_OUTOF10: 0
PAINLEVEL_OUTOF10: 3
PAINLEVEL_OUTOF10: 0

## 2018-10-12 NOTE — ED TRIAGE NOTES
Pt ambulatory to triage. Hx of Chron's disease. Pt had a flare up about a week and a half ago. Pt states he has a significant amount of blood in his stool. Pt states this episode is not resolving. Pt also c/o low back pain, pt states he always get back pain with flare-ups.     Chief Complaint   Patient presents with   • Bloody Stools   • Low Back Pain     Pt placed in lobby, updated on triage process. Pt educated to notified RN or triage tech if changes in condition.

## 2018-10-12 NOTE — ED NOTES
Patient here with c/o of flair up chron's disease. States 8 days ago bloody diarrhea, states he tried liquid diet, and bowel rest reports no change or relief of symptoms. Patient c/o significant abdominal pain. Pain sharp and stabbing, worse prior to bowel movement.

## 2018-10-12 NOTE — ED PROVIDER NOTES
"ED Provider Note    CHIEF COMPLAINT  Chief Complaint   Patient presents with   • Bloody Stools   • Low Back Pain       HPI  Michael Paul is a 42 y.o. male with a history of Crohn's disease, diagnosed approximately 4 years ago who presents with complaints of abdominal pain along with bloody stools for the past 8 days.  The patient says that he has been doing fairly well for the past 2 years, and was on no medications.  He says he started having some bloody stools and crampy abdominal pain with diarrhea beginning about 8 days ago.  The patient has been trying a liquid diet for the past several days with no improvement.  He says the pain is mostly cramping and then occasionally gets a very sharp pain.  He has had no fever, chills, sore throat, cough, vomiting, or diarrhea.  He says the pain is \"all over\" his abdomen, usually occurs after he tries have a bowel movement.  The patient says he has no regular physician at this time.    REVIEW OF SYSTEMS  See HPI for further details. All other systems are negative.     PAST MEDICAL HISTORY  Past Medical History:   Diagnosis Date   • Crohn's colitis (HCC)        FAMILY HISTORY  Family History   Problem Relation Age of Onset   • No Known Problems Mother    • No Known Problems Father        SOCIAL HISTORY  Social History     Social History   • Marital status: Single     Spouse name: N/A   • Number of children: N/A   • Years of education: N/A     Social History Main Topics   • Smoking status: Current Every Day Smoker     Packs/day: 0.50     Years: 15.00     Types: Cigarettes     Last attempt to quit: 8/24/2016   • Smokeless tobacco: Never Used   • Alcohol use Yes      Comment: occasional   • Drug use: Yes     Types: Inhaled      Comment: marajauana daily   • Sexual activity: Not on file     Other Topics Concern   • Not on file     Social History Narrative   • No narrative on file       SURGICAL HISTORY  Past Surgical History:   Procedure Laterality Date   • IRRIGATION & " DEBRIDEMENT GENERAL Right 7/17/2015    Procedure: IRRIGATION & DEBRIDEMENT GENERAL;  Surgeon: Sandoval Chand M.D.;  Location: SURGERY [facility] [place];  Service:    • SIGMOIDOSCOPY FLEXIBLE WITH BIOPSY  11/29/2013    Performed by Alex Hodgson M.D. at ENDOSCOPY [facility]       CURRENT MEDICATIONS  Home Medications     Reviewed by Bashir Adler R.N. (Registered Nurse) on 10/12/18 at 1110  Med List Status: Not Addressed   Medication Last Dose Status        Patient Elvis Taking any Medications                       ALLERGIES  Allergies   Allergen Reactions   • Prednisone Hives       PHYSICAL EXAM  VITAL SIGNS: /72   Pulse 99   Temp 37.1 °C (98.8 °F)   Resp 18   Wt 63.7 kg (140 lb 6.9 oz)   SpO2 99%   BMI 22.67 kg/m²   Constitutional: Awake, alert, in no acute distress, Non-toxic appearance.   HENT: Atraumatic. Bilateral external ears normal, mucous membranes mildly dry, throat nonerythematous without exudates, nose is normal.  Eyes: PERRL, EOMI, conjunctiva moist, noninjected.  Neck: Nontender, Normal range of motion, No nuchal rigidity, No stridor.   Lymphatic: No lymphadenopathy noted.   Cardiovascular: Regular rate and rhythm, no murmurs, rubs, gallops.  Thorax & Lungs:  Good breath sounds bilaterally, no wheezes, rales, or retractions.  No chest tenderness.  Abdomen: Bowel sounds normal, Soft, nondistended,  there is tenderness to the left and right lower quadrants, no tenderness to the upper abdomen, no rebound, guarding, masses.  Back: No CVA or spinal tenderness.  Extremities: Intact distal pulses, No edema, No tenderness.   Skin: Warm, Dry, No rashes.   Musculoskeletal: No joint swelling or tenderness.  Neurologic: Alert & oriented x 3, sensory and motor function normal. No focal deficits.   Psychiatric: Affect normal, Judgment normal, Mood normal.       RADIOLOGY/PROCEDURES  CT-ABDOMEN-PELVIS WITH   Final Result      Descending and sigmoid colitis. Infectious/inflammatory/neoplastic  etiologies are possible. Follow-up is recommended.            COURSE & MEDICAL DECISION MAKING  Pertinent Labs & Imaging studies reviewed. (See chart for details)  The patient presents with the above complaints.  Clinically he appears dehydrated with dry mucous membranes.  He was not given oral fluids as this would likely exacerbate his Crohn's and not be well absorbed, and he was hydrated with IV fluids.  He was given a dose of morphine and Zofran.  CBC shows a white count of 8100, hemoglobin 13.6, 61% polys, chemistry shows a sodium 132, otherwise normal, lipase normal, INR 1.01, urinalysis shows trace ketones, moderate blood, 10-20 RBCs.  CT scan of the abdomen/pelvis with IV contrast was obtained which shows descending and sigmoid colitis.  The patient was given a dose of Cipro and Flagyl. Given this is likely a flare of his Crohn's disease, he was given Solu-Medrol 125 mg IV.  Case was discussed with the medical school for admission.    FINAL IMPRESSION  1.  Descending and sigmoid colitis  2.  Exacerbation of Crohn's disease  3.         Electronically signed by: Jadon Sawyer, 10/12/2018 2:26 PM

## 2018-10-13 PROBLEM — D64.9 ANEMIA: Status: ACTIVE | Noted: 2018-10-13

## 2018-10-13 LAB
ALBUMIN SERPL BCP-MCNC: 3.8 G/DL (ref 3.2–4.9)
ALBUMIN/GLOB SERPL: 1 G/DL
ALP SERPL-CCNC: 88 U/L (ref 30–99)
ALT SERPL-CCNC: 11 U/L (ref 2–50)
ANION GAP SERPL CALC-SCNC: 9 MMOL/L (ref 0–11.9)
AST SERPL-CCNC: 16 U/L (ref 12–45)
BASOPHILS # BLD AUTO: 0.3 % (ref 0–1.8)
BASOPHILS # BLD: 0.02 K/UL (ref 0–0.12)
BILIRUB SERPL-MCNC: 0.4 MG/DL (ref 0.1–1.5)
BUN SERPL-MCNC: 15 MG/DL (ref 8–22)
CALCIUM SERPL-MCNC: 8.7 MG/DL (ref 8.5–10.5)
CHLORIDE SERPL-SCNC: 103 MMOL/L (ref 96–112)
CO2 SERPL-SCNC: 22 MMOL/L (ref 20–33)
CREAT SERPL-MCNC: 0.97 MG/DL (ref 0.5–1.4)
EOSINOPHIL # BLD AUTO: 0 K/UL (ref 0–0.51)
EOSINOPHIL NFR BLD: 0 % (ref 0–6.9)
ERYTHROCYTE [DISTWIDTH] IN BLOOD BY AUTOMATED COUNT: 44.6 FL (ref 35.9–50)
GLOBULIN SER CALC-MCNC: 3.9 G/DL (ref 1.9–3.5)
GLUCOSE SERPL-MCNC: 109 MG/DL (ref 65–99)
HCT VFR BLD AUTO: 38.6 % (ref 42–52)
HGB BLD-MCNC: 13 G/DL (ref 14–18)
IMM GRANULOCYTES # BLD AUTO: 0.03 K/UL (ref 0–0.11)
IMM GRANULOCYTES NFR BLD AUTO: 0.4 % (ref 0–0.9)
LYMPHOCYTES # BLD AUTO: 0.78 K/UL (ref 1–4.8)
LYMPHOCYTES NFR BLD: 10.5 % (ref 22–41)
MCH RBC QN AUTO: 29.3 PG (ref 27–33)
MCHC RBC AUTO-ENTMCNC: 33.7 G/DL (ref 33.7–35.3)
MCV RBC AUTO: 87.1 FL (ref 81.4–97.8)
MONOCYTES # BLD AUTO: 0.16 K/UL (ref 0–0.85)
MONOCYTES NFR BLD AUTO: 2.2 % (ref 0–13.4)
NEUTROPHILS # BLD AUTO: 6.42 K/UL (ref 1.82–7.42)
NEUTROPHILS NFR BLD: 86.6 % (ref 44–72)
NRBC # BLD AUTO: 0 K/UL
NRBC BLD-RTO: 0 /100 WBC
PLATELET # BLD AUTO: 349 K/UL (ref 164–446)
PMV BLD AUTO: 8.3 FL (ref 9–12.9)
POTASSIUM SERPL-SCNC: 4.2 MMOL/L (ref 3.6–5.5)
PROT SERPL-MCNC: 7.7 G/DL (ref 6–8.2)
RBC # BLD AUTO: 4.43 M/UL (ref 4.7–6.1)
SODIUM SERPL-SCNC: 134 MMOL/L (ref 135–145)
WBC # BLD AUTO: 7.4 K/UL (ref 4.8–10.8)

## 2018-10-13 PROCEDURE — 700102 HCHG RX REV CODE 250 W/ 637 OVERRIDE(OP): Performed by: INTERNAL MEDICINE

## 2018-10-13 PROCEDURE — 99222 1ST HOSP IP/OBS MODERATE 55: CPT | Mod: GC | Performed by: INTERNAL MEDICINE

## 2018-10-13 PROCEDURE — 700102 HCHG RX REV CODE 250 W/ 637 OVERRIDE(OP): Performed by: STUDENT IN AN ORGANIZED HEALTH CARE EDUCATION/TRAINING PROGRAM

## 2018-10-13 PROCEDURE — A9270 NON-COVERED ITEM OR SERVICE: HCPCS | Performed by: STUDENT IN AN ORGANIZED HEALTH CARE EDUCATION/TRAINING PROGRAM

## 2018-10-13 PROCEDURE — 700111 HCHG RX REV CODE 636 W/ 250 OVERRIDE (IP): Performed by: STUDENT IN AN ORGANIZED HEALTH CARE EDUCATION/TRAINING PROGRAM

## 2018-10-13 PROCEDURE — 700105 HCHG RX REV CODE 258: Performed by: STUDENT IN AN ORGANIZED HEALTH CARE EDUCATION/TRAINING PROGRAM

## 2018-10-13 PROCEDURE — C9113 INJ PANTOPRAZOLE SODIUM, VIA: HCPCS | Performed by: STUDENT IN AN ORGANIZED HEALTH CARE EDUCATION/TRAINING PROGRAM

## 2018-10-13 PROCEDURE — 85025 COMPLETE CBC W/AUTO DIFF WBC: CPT

## 2018-10-13 PROCEDURE — A9270 NON-COVERED ITEM OR SERVICE: HCPCS | Performed by: INTERNAL MEDICINE

## 2018-10-13 PROCEDURE — 770006 HCHG ROOM/CARE - MED/SURG/GYN SEMI*

## 2018-10-13 PROCEDURE — 700101 HCHG RX REV CODE 250: Performed by: STUDENT IN AN ORGANIZED HEALTH CARE EDUCATION/TRAINING PROGRAM

## 2018-10-13 PROCEDURE — 36415 COLL VENOUS BLD VENIPUNCTURE: CPT

## 2018-10-13 PROCEDURE — 80053 COMPREHEN METABOLIC PANEL: CPT

## 2018-10-13 PROCEDURE — 94760 N-INVAS EAR/PLS OXIMETRY 1: CPT

## 2018-10-13 RX ORDER — SULFASALAZINE 500 MG/1
1000 TABLET ORAL 2 TIMES DAILY
Status: DISCONTINUED | OUTPATIENT
Start: 2018-10-13 | End: 2018-10-14 | Stop reason: HOSPADM

## 2018-10-13 RX ORDER — OMEPRAZOLE 20 MG/1
40 CAPSULE, DELAYED RELEASE ORAL 2 TIMES DAILY
Status: DISCONTINUED | OUTPATIENT
Start: 2018-10-13 | End: 2018-10-14 | Stop reason: HOSPADM

## 2018-10-13 RX ORDER — PREDNISONE 20 MG/1
40 TABLET ORAL DAILY
Status: DISCONTINUED | OUTPATIENT
Start: 2018-10-13 | End: 2018-10-14 | Stop reason: HOSPADM

## 2018-10-13 RX ADMIN — VITAMIN D, TAB 1000IU (100/BT) 5000 UNITS: 25 TAB at 05:44

## 2018-10-13 RX ADMIN — SULFASALAZINE 1000 MG: 500 TABLET ORAL at 17:11

## 2018-10-13 RX ADMIN — OMEPRAZOLE 40 MG: 20 CAPSULE, DELAYED RELEASE ORAL at 17:11

## 2018-10-13 RX ADMIN — THIAMINE HYDROCHLORIDE 100 MG: 100 INJECTION, SOLUTION INTRAMUSCULAR; INTRAVENOUS at 05:43

## 2018-10-13 RX ADMIN — NICOTINE 14 MG: 14 PATCH, EXTENDED RELEASE TRANSDERMAL at 05:42

## 2018-10-13 RX ADMIN — PREDNISONE 40 MG: 20 TABLET ORAL at 07:55

## 2018-10-13 RX ADMIN — FOLIC ACID 1 MG: 5 INJECTION, SOLUTION INTRAMUSCULAR; INTRAVENOUS; SUBCUTANEOUS at 05:45

## 2018-10-13 RX ADMIN — SULFASALAZINE 1000 MG: 500 TABLET ORAL at 07:55

## 2018-10-13 RX ADMIN — OMEPRAZOLE 40 MG: 20 CAPSULE, DELAYED RELEASE ORAL at 11:11

## 2018-10-13 RX ADMIN — PANTOPRAZOLE SODIUM 40 MG: 40 INJECTION, POWDER, LYOPHILIZED, FOR SOLUTION INTRAVENOUS at 05:44

## 2018-10-13 RX ADMIN — ACETAMINOPHEN 650 MG: 325 TABLET, FILM COATED ORAL at 20:57

## 2018-10-13 ASSESSMENT — ENCOUNTER SYMPTOMS
BLURRED VISION: 0
DEPRESSION: 0
PHOTOPHOBIA: 0
CHILLS: 0
NAUSEA: 0
SORE THROAT: 0
COUGH: 0
BACK PAIN: 1
NERVOUS/ANXIOUS: 0
DIZZINESS: 0
FOCAL WEAKNESS: 0
HEMOPTYSIS: 0
VOMITING: 0
ABDOMINAL PAIN: 1
NERVOUS/ANXIOUS: 1
NAUSEA: 1
FEVER: 0
WEAKNESS: 1
SPEECH CHANGE: 0
POLYDIPSIA: 0
NECK PAIN: 0
BLOOD IN STOOL: 1
PALPITATIONS: 0
DIARRHEA: 1
HEADACHES: 0
BRUISES/BLEEDS EASILY: 0
BACK PAIN: 0
SHORTNESS OF BREATH: 0

## 2018-10-13 ASSESSMENT — LIFESTYLE VARIABLES: EVER_SMOKED: YES

## 2018-10-13 ASSESSMENT — PAIN SCALES - GENERAL
PAINLEVEL_OUTOF10: 0
PAINLEVEL_OUTOF10: 4

## 2018-10-13 NOTE — ASSESSMENT & PLAN NOTE
- Pt has bloody diarrhea due to crohn's flare  - Check iron: 78. TIBC:314, Ferritin: 48.6, folate: 12.8, Vitamin B12 :1065  - f/u with pcp

## 2018-10-13 NOTE — ED NOTES
Pt resting in bed at this time. Iv abx complete at this time. Pt denies pain, denies any needs. Awaiting admit bed. Updated with plan of care. Pt in nad. Will continue to monitor.

## 2018-10-13 NOTE — SENIOR ADMIT NOTE
"Senior Admit Note    Patient: Michael Paul.  MRN: [id number].                               Chief complaint: abdominal pain   ROS positive for intermittent bloody (\"sometimes bright, sometimes darker red\") diarrhea-quality (\"soft, watery\") stools, generalized weakness.  Denies h/o extraintestinal manifestations of Crohn's.    Objective:   Pertinent physical findings: Tenderness to palpation of left and right lower quadrants.  Bowel sounds present.  No peritoneal signs.       Labs:  WBC 8.1.  Na 132.  ESR 62, CRP 4.93.  PT 13.4, INR 1.01, APTT 32.5.      Imaging:  CT abdomen/pelvis showed descending and sigmoid colitis.     Assessment/Plan:  42 yoM with PMHx Crohn's disease (diagnosed 2014, followed by Dr. Patiño) here with flare up of Crohn's.  Was given dose of ciprofloxacin and metronidazole overnight.  Also given IV methylprednisolone 125 mg in the ED.  Patient currently hemodynamically stable with no peritoneal signs.  NPO.  IVF.  Start prednisone 40 mg daily and sulfasalazine 1000 mg twice daily for Crohn's flare.  Day team to consult GI for further recommendations on medications/dose adjustments.  Patient had previously been treated with 2000 mg sulfasalazine twice daily, though recommendations may be as high as 3 to 6 g/day in divided doses for up to 16 weeks.  Consider abx as needed.  Of note, medical record shows patient with allergy to prednisone, but patient reports he has tolerated prednisone in the past (and tolerated methylprednisolone on admission).      DVT prophylaxis: SCDs given recent bloody stools.   Code status: FULL.    For complete details, please refer to H&P by intern, Dr. Faria.       Priscilla Ross M.D.            "

## 2018-10-13 NOTE — ASSESSMENT & PLAN NOTE
History of Crohn's disease, diagnosed 4 years ago, in remission for the past 2 years via self-prescribed cannabinoid therapy and dietary modifications, triggers include stress, alcohol use and unhealthy foods.  Reports onset of flare 1.5 weeks ago with abd pain, started having bloody diarrhea 6 days ago with 6-8 loose BMs per day with BRBPR  Moderately to severely active Crohn's disease per the Crohn's Disease Activity Index (score of approximately 400 points)  IV fluid discontinued  Regular diet  Continue sulfasalazine 1000 mg BID  Continue and bethany prednisone over 5 weeks  No need for antibiotics as pt is improving clinically  GI consulted, will follow up as outpatient  Pt is medically stable to be discharged with outpatient follow up with pcp and GI

## 2018-10-13 NOTE — CARE PLAN
Problem: Knowledge Deficit  Goal: Knowledge of disease process/condition, treatment plan, diagnostic tests, and medications will improve  Outcome: PROGRESSING AS EXPECTED  Pt was oriented to unit and treatment plan of care. Pt was educated regarding medication received in ER IV solumedrol. All questions answered at this time, POC discussed.    Problem: Pain Management  Goal: Pain level will decrease to patient's comfort goal  Outcome: PROGRESSING AS EXPECTED  Pt was medicated per MAR for pain medication PRN for pain level of 7/10 located in abdomen. Pain level has decreased pt is able to rest comfortably at this time.

## 2018-10-13 NOTE — H&P
Internal Medicine Admitting History and Physical    Note Author: Angelina Faria M.D.       Name Michael Paul 1975   Age/Sex 42 y.o. male   MRN [id number]   Code Status Full Code     After 5PM or if no immediate response to page, please call for cross-coverage  Attending: hCarline  Team: Juan Jose See patient list for primary contact information  Call [phone] to page    1st Call: Day Intern, R1  Marleni 2nd Call: Day Sr. Resident, R2-R3  Hong       Chief Complaint  Crohn's flare      HPI  42-year-old male with history of Crohn's disease diagnosed 4 years ago who presents with approximately 10-day history of abdominal pain associated with bloody diarrhea for the past 6 days. Reports 6-day history of 7-8 loose bloody bowel movements associated with worsening of abdominal pain. Also reports poor p.o. intake due to abdominal pain with defecation and generalized weakness.  Notes that his Crohn's disease triggers are stress alcohol and unhealthy foods. Denies alcohol use and reports maintaining a healthy diet but notes that he has been under a lot of stress recently and believes this has triggered his Crohn's disease flare.     Reports being in remission for the past 2 years with self-prescribed cannabinoid therapy, dietary modifications and discontinuing alcohol use. Reports using vaporized cannabis; uses 1 cartridge of cannabis oil per week. Notes disliking the drugs used for maintenance therapy in Crohn's disease, describes being on Prednisone 2 years ago and developing a severe skin infection starting behind his R ear and spreading across his neck that required incision an drainage. Denies history of extraintestinal manifestations of Crohn's disease.     Notes that he will be moving to [place] in 2-3 weeks due to his job. Says he hasn't been in the care of a PCP or gastroenterologist for the past 2 years due to lack of health insurance. Reports smoking 3-4 cigarettes per day currently with  tobacco use hx of 0.25 ppd x 20 years. Denies any other drug use and last alcohol use was 1 beer several months ago. Last colonoscopy was in 2014 per patient report.       Review of Systems   Constitutional: Positive for malaise/fatigue. Negative for fever.   HENT: Negative for ear pain, nosebleeds and sore throat.    Eyes: Negative for blurred vision and photophobia.   Respiratory: Negative for cough and hemoptysis.    Cardiovascular: Negative for chest pain and leg swelling.   Gastrointestinal: Positive for abdominal pain (moderate at rest, severe with defecation), blood in stool, diarrhea (7-8 loose and bloody stools per day) and nausea. Negative for vomiting.   Genitourinary: Negative for dysuria and hematuria.   Musculoskeletal: Positive for back pain (notes lower back pain similar to prior Crohn's disease falres).   Skin: Negative for itching and rash.   Neurological: Positive for weakness. Negative for speech change, focal weakness and headaches.   Endo/Heme/Allergies: Negative for polydipsia. Does not bruise/bleed easily.   Psychiatric/Behavioral: Negative for depression. The patient is nervous/anxious.            Past Medical History  Patient Active Problem List    Diagnosis Date Noted   • Crohn's colitis ([facility]) 10/15/2016     Priority: High   • Crohn disease ([facility]) 05/09/2015     Priority: High   • Pneumonia 06/21/2017   • Sepsis(995.91) 06/21/2017   • Hypokalemia 06/21/2017   • Hyponatremia 10/16/2016   • Smoking 10/16/2016   • Cellulitis 07/16/2015   • Sepsis ([facility]) 07/16/2015   • Leukocytosis 05/10/2015   • Hyponatremia 05/10/2015   • Colitis, acute 05/09/2015   • Crohn's disease ([facility]) 02/17/2014   • Colitis 11/27/2013       Past Surgical History  Past Surgical History:   Procedure Laterality Date   • IRRIGATION & DEBRIDEMENT GENERAL Right 7/17/2015    Procedure: IRRIGATION & DEBRIDEMENT GENERAL;  Surgeon: Sandoval Chand M.D.;  Location: SURGERY [facility];  Service:    • SIGMOIDOSCOPY FLEXIBLE  "WITH BIOPSY  11/29/2013    Performed by Alex Hodgson M.D. at ENDOSCOPY [facility] ORS       Current Outpatient Medications  No current facility-administered medications on file prior to encounter.      No current outpatient prescriptions on file prior to encounter.       Allergies  Allergies   Allergen Reactions   • Prednisone Hives     \"causes infection\"        Family History  Family History   Problem Relation Age of Onset   • No Known Problems Mother    • No Known Problems Father        Social History  Social History     Social History   • Marital status: Single     Spouse name: N/A   • Number of children: N/A   • Years of education: N/A     Occupational History   • Not on file.     Social History Main Topics   • Smoking status: Current Every Day Smoker     Packs/day: 0.50     Years: 15.00     Types: Cigarettes     Last attempt to quit: 8/24/2016   • Smokeless tobacco: Never Used   • Alcohol use Yes      Comment: occasional   • Drug use: Yes     Types: Inhaled      Comment: marajauana daily   • Sexual activity: Not on file     Other Topics Concern   • Not on file     Social History Narrative   • No narrative on file       Vitals  Vitals:    10/12/18 2106 10/13/18 0252 10/13/18 0328 10/13/18 0335   BP: 109/61 106/61  110/60   Pulse: 61 61 62 (!) 59   Resp: 18  18 16   Temp: 36.7 °C (98.1 °F) 36.7 °C (98.1 °F)  36.2 °C (97.1 °F)   SpO2:  97% 97% 97%   Weight:  63.5 kg (140 lb)         Physical Exam   Constitutional: He is oriented to person, place, and time. He appears distressed (mild distress from abd pain).   HENT:   Head: Normocephalic and atraumatic.   Right Ear: External ear normal.   Left Ear: External ear normal.   Nose: Nose normal.   Mouth/Throat: Oropharynx is clear and moist. No oropharyngeal exudate.   Eyes: Pupils are equal, round, and reactive to light. Conjunctivae and EOM are normal. Right eye exhibits no discharge. Left eye exhibits no discharge. No scleral icterus.   Neck: Normal range of " motion. Neck supple. No thyromegaly present.   Cardiovascular: Normal rate, regular rhythm, normal heart sounds and intact distal pulses.  Exam reveals no gallop and no friction rub.    No murmur heard.  Pulmonary/Chest: No respiratory distress. He has no wheezes. He has no rales. He exhibits no tenderness.   Abdominal: He exhibits no distension and no mass. There is tenderness (denies tenderness presently as he received pain medication). There is no rebound and no guarding.   Musculoskeletal: Normal range of motion. He exhibits no edema, tenderness or deformity.   Neurological: He is alert and oriented to person, place, and time. No cranial nerve deficit. He exhibits normal muscle tone. Coordination normal.   Skin: Skin is warm and dry. No rash noted. He is not diaphoretic. No erythema. No pallor.   Psychiatric: Mood and affect normal.   Other: hyperactive bowel sounds, no lymphadenopathy     Lab Data  Recent Results (from the past 24 hour(s))   CBC WITH DIFFERENTIAL    Collection Time: 10/12/18 11:54 AM   Result Value Ref Range    WBC 8.1 4.8 - 10.8 K/uL    RBC 4.60 (L) 4.70 - 6.10 M/uL    Hemoglobin 13.6 (L) 14.0 - 18.0 g/dL    Hematocrit 41.3 (L) 42.0 - 52.0 %    MCV 89.8 81.4 - 97.8 fL    MCH 29.6 27.0 - 33.0 pg    MCHC 32.9 (L) 33.7 - 35.3 g/dL    RDW 45.8 35.9 - 50.0 fL    Platelet Count 409 164 - 446 K/uL    MPV 8.2 (L) 9.0 - 12.9 fL    Neutrophils-Polys 61.40 44.00 - 72.00 %    Lymphocytes 24.10 22.00 - 41.00 %    Monocytes 12.20 0.00 - 13.40 %    Eosinophils 1.60 0.00 - 6.90 %    Basophils 0.50 0.00 - 1.80 %    Immature Granulocytes 0.20 0.00 - 0.90 %    Nucleated RBC 0.00 /100 WBC    Neutrophils (Absolute) 4.96 1.82 - 7.42 K/uL    Lymphs (Absolute) 1.95 1.00 - 4.80 K/uL    Monos (Absolute) 0.99 (H) 0.00 - 0.85 K/uL    Eos (Absolute) 0.13 0.00 - 0.51 K/uL    Baso (Absolute) 0.04 0.00 - 0.12 K/uL    Immature Granulocytes (abs) 0.02 0.00 - 0.11 K/uL    NRBC (Absolute) 0.00 K/uL   COMP METABOLIC PANEL     Collection Time: 10/12/18 11:54 AM   Result Value Ref Range    Sodium 132 (L) 135 - 145 mmol/L    Potassium 3.8 3.6 - 5.5 mmol/L    Chloride 100 96 - 112 mmol/L    Co2 22 20 - 33 mmol/L    Anion Gap 10.0 0.0 - 11.9    Glucose 84 65 - 99 mg/dL    Bun 18 8 - 22 mg/dL    Creatinine 0.90 0.50 - 1.40 mg/dL    Calcium 9.3 8.5 - 10.5 mg/dL    AST(SGOT) 19 12 - 45 U/L    ALT(SGPT) 14 2 - 50 U/L    Alkaline Phosphatase 93 30 - 99 U/L    Total Bilirubin 0.3 0.1 - 1.5 mg/dL    Albumin 4.3 3.2 - 4.9 g/dL    Total Protein 8.6 (H) 6.0 - 8.2 g/dL    Globulin 4.3 (H) 1.9 - 3.5 g/dL    A-G Ratio 1.0 g/dL   LIPASE    Collection Time: 10/12/18 11:54 AM   Result Value Ref Range    Lipase 16 11 - 82 U/L   ESTIMATED GFR    Collection Time: 10/12/18 11:54 AM   Result Value Ref Range    GFR If African American >60 >60 mL/min/1.73 m 2    GFR If Non African American >60 >60 mL/min/1.73 m 2   CRP QUANTITIVE (NON-CARDIAC)    Collection Time: 10/12/18 11:54 AM   Result Value Ref Range    Stat C-Reactive Protein 4.93 (H) 0.00 - 0.75 mg/dL   PT/INR    Collection Time: 10/12/18  1:06 PM   Result Value Ref Range    PT 13.4 12.0 - 14.6 sec    INR 1.01 0.87 - 1.13   PTT    Collection Time: 10/12/18  1:06 PM   Result Value Ref Range    APTT 32.5 24.7 - 36.0 sec   WESTERGREN SED RATE    Collection Time: 10/12/18  1:06 PM   Result Value Ref Range    Sed Rate Westergren 62 (H) 0 - 15 mm/hour   URINALYSIS,CULTURE IF INDICATED    Collection Time: 10/12/18  2:45 PM   Result Value Ref Range    Color Yellow     Character Clear     Specific Gravity 1.025 <1.035    Ph 6.0 5.0 - 8.0    Glucose Negative Negative mg/dL    Ketones Trace (A) Negative mg/dL    Protein Negative Negative mg/dL    Bilirubin Negative Negative    Urobilinogen, Urine 0.2 Negative    Nitrite Negative Negative    Leukocyte Esterase Negative Negative    Occult Blood Moderate (A) Negative    Micro Urine Req Microscopic    URINE MICROSCOPIC (W/UA)    Collection Time: 10/12/18  2:45 PM   Result  Value Ref Range    RBC 10-20 (A) /hpf    Mucous Threads Few /hpf   COD (ADULT)    Collection Time: 10/12/18  8:07 PM   Result Value Ref Range    ABO Grouping Only O     Rh Grouping Only POS     Antibody Screen-Cod NEG    CBC with Differential    Collection Time: 10/13/18  2:13 AM   Result Value Ref Range    WBC 7.4 4.8 - 10.8 K/uL    RBC 4.43 (L) 4.70 - 6.10 M/uL    Hemoglobin 13.0 (L) 14.0 - 18.0 g/dL    Hematocrit 38.6 (L) 42.0 - 52.0 %    MCV 87.1 81.4 - 97.8 fL    MCH 29.3 27.0 - 33.0 pg    MCHC 33.7 33.7 - 35.3 g/dL    RDW 44.6 35.9 - 50.0 fL    Platelet Count 349 164 - 446 K/uL    MPV 8.3 (L) 9.0 - 12.9 fL    Neutrophils-Polys 86.60 (H) 44.00 - 72.00 %    Lymphocytes 10.50 (L) 22.00 - 41.00 %    Monocytes 2.20 0.00 - 13.40 %    Eosinophils 0.00 0.00 - 6.90 %    Basophils 0.30 0.00 - 1.80 %    Immature Granulocytes 0.40 0.00 - 0.90 %    Nucleated RBC 0.00 /100 WBC    Neutrophils (Absolute) 6.42 1.82 - 7.42 K/uL    Lymphs (Absolute) 0.78 (L) 1.00 - 4.80 K/uL    Monos (Absolute) 0.16 0.00 - 0.85 K/uL    Eos (Absolute) 0.00 0.00 - 0.51 K/uL    Baso (Absolute) 0.02 0.00 - 0.12 K/uL    Immature Granulocytes (abs) 0.03 0.00 - 0.11 K/uL    NRBC (Absolute) 0.00 K/uL   Comp Metabolic Panel (CMP)    Collection Time: 10/13/18  2:13 AM   Result Value Ref Range    Sodium 134 (L) 135 - 145 mmol/L    Potassium 4.2 3.6 - 5.5 mmol/L    Chloride 103 96 - 112 mmol/L    Co2 22 20 - 33 mmol/L    Anion Gap 9.0 0.0 - 11.9    Glucose 109 (H) 65 - 99 mg/dL    Bun 15 8 - 22 mg/dL    Creatinine 0.97 0.50 - 1.40 mg/dL    Calcium 8.7 8.5 - 10.5 mg/dL    AST(SGOT) 16 12 - 45 U/L    ALT(SGPT) 11 2 - 50 U/L    Alkaline Phosphatase 88 30 - 99 U/L    Total Bilirubin 0.4 0.1 - 1.5 mg/dL    Albumin 3.8 3.2 - 4.9 g/dL    Total Protein 7.7 6.0 - 8.2 g/dL    Globulin 3.9 (H) 1.9 - 3.5 g/dL    A-G Ratio 1.0 g/dL   ESTIMATED GFR    Collection Time: 10/13/18  2:13 AM   Result Value Ref Range    GFR If African American >60 >60 mL/min/1.73 m 2    GFR If  Non African American >60 >60 mL/min/1.73 m 2       Imaging and Procedures  Independant Imaging Review: Completed     CT-ABDOMEN-PELVIS WITH   Final Result      Descending and sigmoid colitis. Infectious/inflammatory/neoplastic etiologies are possible. Follow-up is recommended.           Assessment and Plan    Crohn's colitis (HCC)  History of Crohn's disease, diagnosed 4 years ago, in remission for the past 2 years via self-prescribed cannabinoid therapy and dietary modifications, triggers include stress, alcohol use and unhealthy foods.  Reports onset of flare 1.5 weeks ago with abd pain, started having bloody diarrhea 6 days ago with 6-8 loose BMs per day with BRBPR    Moderately to severely active Crohn's disease per the Crohn's Disease Activity Index (score of approximately 400 points)    IV NS at 125 cc/hr  Sulfasalazine 1000 mg BID per prior active disease regimen on chart review   Prednisone 40 mg PO daily per prior active disease regimen (status post 250 mg IV methylprednisolone in the ED)  Administered 400 mg IV cipro and 500 mg IV Flagyl in the ED, day team to consider continuing antibiotics  Deferred ABHIJIT, FOBT pending   CTM with serial CBC and CMP  GI consulted, appreciate recs      Anticipated Hospital Stay:  >2 midnights  Quality-Core Measures: SCDs for DVT given active bleeding  PCP: Pt States None

## 2018-10-13 NOTE — PROGRESS NOTES
Assumed pt care at 0700, no c/o pain or discomfort. Pt A&O x4, up self to restroom. Pt stated bowel movement with no blood this morning. Diet ordered full liquid, advance as tolerated (nursing communication order). Pt tolerated lunch well, no c/o nausea or pain.

## 2018-10-13 NOTE — CARE PLAN
Problem: Communication  Goal: The ability to communicate needs accurately and effectively will improve  Outcome: PROGRESSING AS EXPECTED  Pt able to express needs effectively, uses call light appropriately, A&O x4, expresses understanding of disease process. Continue to assess needs and address appropriately    Problem: Safety  Goal: Will remain free from injury  Outcome: PROGRESSING AS EXPECTED  Pt remains free from falls and injury throughout this shift. Continue to assess risk for falls and injury and implement interventions as necessary

## 2018-10-13 NOTE — PROGRESS NOTES
Pt arrived to unit via hospital bed at 2100 . Pt oriented to room, unit, and plan of care. All questions answered at this time. Call light within reach; fall precautions in place.

## 2018-10-13 NOTE — ED NOTES
Med rec complete per pt at bedside  Allergies have been verified and updated  No ABX within the last 30 days

## 2018-10-13 NOTE — PROGRESS NOTES
2 RN skin check completed at this time with RN April. There are no skin areas of concern. All skin is clean dry and intact.

## 2018-10-13 NOTE — PROGRESS NOTES
Internal Medicine Interval Note  Note Author: Luly Yepez M.D.     Name Michael Paul       1975   Age/Sex 42 y.o. male   MRN [id number]   Code Status Full     After 5PM or if no immediate response to page, please call for cross-coverage  Attending/Team: Charline/Juan Jose See Patient List for primary contact information  Call [phone] to page    1st Call - Day Intern (R1):   Dr. Yepez 2nd Call - Day Sr. Resident (R2/R3):   Dr. Pitts       Reason for interval visit  (Principal Problem)   Crohn's flare    Interval Problem Daily Status Update  (24 hours, problem oriented, brief subjective history, new lab/imaging data pertinent to that problem)   Mr. Paul, a pleasant 42-year-old male admitted on 10/12/2018 with a 10-day history of abdominal pain associated with 6-day history of bloody diarrhea.  Patient was first diagnosed with Crohn's disease in  and was in remission with cannabinoid therapy and diet control as per his own therapy.  Initially patient was on sulfasalazine and prednisolone however patient developed cellulitis while on prednisone and decided to stop medical therapy.  Patient was in remission for the last 2 years.  At baseline, patient has 2 BM per day, nonbloody without any abdominal pain.    Overnight patient states that he feels much better, his abdominal pain has improved as well as less blood in the diarrhea.  Patient denies any fever, chills, nausea, vomiting or other symptoms.  We will continue to monitor patient and advance his diet as tolerated. GI has been consulted.    Review of Systems   Constitutional: Negative for chills and fever.   HENT: Negative for congestion and sore throat.    Respiratory: Negative for cough and shortness of breath.    Cardiovascular: Negative for chest pain, palpitations and leg swelling.   Gastrointestinal: Positive for abdominal pain, blood in stool and diarrhea. Negative for nausea and vomiting.   Genitourinary: Negative for  "dysuria, frequency and urgency.   Musculoskeletal: Negative for back pain and neck pain.   Skin: Negative for rash.   Neurological: Negative for dizziness and headaches.   Psychiatric/Behavioral: Negative for depression. The patient is not nervous/anxious.      Disposition/Barriers to discharge:   Remain inpatient/can be discharged home when medically stable    Consultants/Specialty  PCP: Pcp Pt States None    Quality Measures  Quality-Core Measures   Reviewed items::  Labs reviewed and Medications reviewed  Oneal catheter::  No Oneal  DVT prophylaxis pharmacological::  Not indicated at this time, ambulatory  DVT prophylaxis - mechanical:  SCDs      Physical Exam       Vitals:    10/13/18 0328 10/13/18 0335 10/13/18 0654 10/13/18 0730   BP:  110/60 110/60 114/58   Pulse: 62 (!) 59 (!) 59 (!) 58   Resp: 18 16 16 16   Temp:  36.2 °C (97.1 °F) 36.2 °C (97.1 °F) 36.4 °C (97.5 °F)   SpO2: 97% 97% 97% 97%   Weight:   63.5 kg (140 lb)    Height:   1.702 m (5' 7\")      Body mass index is 21.93 kg/m². Weight: 63.5 kg (140 lb)  Oxygen Therapy:  Pulse Oximetry: 97 %, O2 (LPM): 0, FiO2%: 21 %, O2 Delivery: None (Room Air)    Physical Exam   Constitutional: He is oriented to person, place, and time and well-developed, well-nourished, and in no distress.   HENT:   Head: Normocephalic and atraumatic.   Eyes: Pupils are equal, round, and reactive to light. EOM are normal.   Neck: Normal range of motion. Neck supple. No thyromegaly present.   Cardiovascular: Normal rate, regular rhythm and normal heart sounds.    No murmur heard.  Pulmonary/Chest: Breath sounds normal. No respiratory distress. He has no wheezes.   Abdominal: Soft. Bowel sounds are normal. He exhibits no distension. There is no tenderness.   Musculoskeletal: Normal range of motion. He exhibits no edema or deformity.   Neurological: He is alert and oriented to person, place, and time. GCS score is 15.   Skin: Skin is warm and dry. No rash noted. He is not diaphoretic. "   Psychiatric: Mood and affect normal.       Assessment/Plan     Crohn's colitis (HCC)- (present on admission)   Assessment & Plan    History of Crohn's disease, diagnosed 4 years ago, in remission for the past 2 years via self-prescribed cannabinoid therapy and dietary modifications, triggers include stress, alcohol use and unhealthy foods.  Reports onset of flare 1.5 weeks ago with abd pain, started having bloody diarrhea 6 days ago with 6-8 loose BMs per day with BRBPR    Moderately to severely active Crohn's disease per the Crohn's Disease Activity Index (score of approximately 400 points)    IV NS at 125 cc/hr  Sulfasalazine 1000 mg BID  Prednisone 40 mg PO daily  GI consult        Anemia   Assessment & Plan    - Pt has bloody diarrhea due to crohn's flare  - Check iron/TIBC, ferritin, folate and B12

## 2018-10-14 ENCOUNTER — PATIENT OUTREACH (OUTPATIENT)
Dept: HEALTH INFORMATION MANAGEMENT | Facility: OTHER | Age: 43
End: 2018-10-14

## 2018-10-14 VITALS
OXYGEN SATURATION: 94 % | TEMPERATURE: 97.7 F | HEART RATE: 73 BPM | DIASTOLIC BLOOD PRESSURE: 69 MMHG | SYSTOLIC BLOOD PRESSURE: 105 MMHG | HEIGHT: 67 IN | RESPIRATION RATE: 18 BRPM | BODY MASS INDEX: 21.97 KG/M2 | WEIGHT: 140 LBS

## 2018-10-14 LAB
ALBUMIN SERPL BCP-MCNC: 3.5 G/DL (ref 3.2–4.9)
ALBUMIN/GLOB SERPL: 1 G/DL
ALP SERPL-CCNC: 80 U/L (ref 30–99)
ALT SERPL-CCNC: 10 U/L (ref 2–50)
ANION GAP SERPL CALC-SCNC: 6 MMOL/L (ref 0–11.9)
AST SERPL-CCNC: 14 U/L (ref 12–45)
BASOPHILS # BLD AUTO: 0.3 % (ref 0–1.8)
BASOPHILS # BLD: 0.03 K/UL (ref 0–0.12)
BILIRUB SERPL-MCNC: 0.3 MG/DL (ref 0.1–1.5)
BUN SERPL-MCNC: 7 MG/DL (ref 8–22)
C DIFF DNA SPEC QL NAA+PROBE: NEGATIVE
C DIFF TOX GENS STL QL NAA+PROBE: NEGATIVE
CALCIUM SERPL-MCNC: 9 MG/DL (ref 8.5–10.5)
CHLORIDE SERPL-SCNC: 107 MMOL/L (ref 96–112)
CO2 SERPL-SCNC: 26 MMOL/L (ref 20–33)
CREAT SERPL-MCNC: 0.82 MG/DL (ref 0.5–1.4)
EOSINOPHIL # BLD AUTO: 0.05 K/UL (ref 0–0.51)
EOSINOPHIL NFR BLD: 0.6 % (ref 0–6.9)
ERYTHROCYTE [DISTWIDTH] IN BLOOD BY AUTOMATED COUNT: 43.5 FL (ref 35.9–50)
FERRITIN SERPL-MCNC: 48.6 NG/ML (ref 22–322)
FOLATE SERPL-MCNC: 12.6 NG/ML
GLOBULIN SER CALC-MCNC: 3.6 G/DL (ref 1.9–3.5)
GLUCOSE SERPL-MCNC: 102 MG/DL (ref 65–99)
HCT VFR BLD AUTO: 36.9 % (ref 42–52)
HGB BLD-MCNC: 12.4 G/DL (ref 14–18)
IMM GRANULOCYTES # BLD AUTO: 0.14 K/UL (ref 0–0.11)
IMM GRANULOCYTES NFR BLD AUTO: 1.6 % (ref 0–0.9)
IRON SATN MFR SERPL: 25 % (ref 15–55)
IRON SERPL-MCNC: 78 UG/DL (ref 50–180)
LYMPHOCYTES # BLD AUTO: 1.36 K/UL (ref 1–4.8)
LYMPHOCYTES NFR BLD: 15.5 % (ref 22–41)
MCH RBC QN AUTO: 28.9 PG (ref 27–33)
MCHC RBC AUTO-ENTMCNC: 33.6 G/DL (ref 33.7–35.3)
MCV RBC AUTO: 86 FL (ref 81.4–97.8)
MONOCYTES # BLD AUTO: 0.91 K/UL (ref 0–0.85)
MONOCYTES NFR BLD AUTO: 10.4 % (ref 0–13.4)
NEUTROPHILS # BLD AUTO: 6.27 K/UL (ref 1.82–7.42)
NEUTROPHILS NFR BLD: 71.6 % (ref 44–72)
NRBC # BLD AUTO: 0 K/UL
NRBC BLD-RTO: 0 /100 WBC
PLATELET # BLD AUTO: 370 K/UL (ref 164–446)
PMV BLD AUTO: 8.4 FL (ref 9–12.9)
POTASSIUM SERPL-SCNC: 3.7 MMOL/L (ref 3.6–5.5)
PROT SERPL-MCNC: 7.1 G/DL (ref 6–8.2)
RBC # BLD AUTO: 4.29 M/UL (ref 4.7–6.1)
SODIUM SERPL-SCNC: 139 MMOL/L (ref 135–145)
TIBC SERPL-MCNC: 314 UG/DL (ref 250–450)
VIT B12 SERPL-MCNC: 1065 PG/ML (ref 211–911)
WBC # BLD AUTO: 8.8 K/UL (ref 4.8–10.8)

## 2018-10-14 PROCEDURE — 82607 VITAMIN B-12: CPT

## 2018-10-14 PROCEDURE — 82728 ASSAY OF FERRITIN: CPT

## 2018-10-14 PROCEDURE — 83540 ASSAY OF IRON: CPT

## 2018-10-14 PROCEDURE — A9270 NON-COVERED ITEM OR SERVICE: HCPCS | Performed by: STUDENT IN AN ORGANIZED HEALTH CARE EDUCATION/TRAINING PROGRAM

## 2018-10-14 PROCEDURE — 700102 HCHG RX REV CODE 250 W/ 637 OVERRIDE(OP): Performed by: STUDENT IN AN ORGANIZED HEALTH CARE EDUCATION/TRAINING PROGRAM

## 2018-10-14 PROCEDURE — 36415 COLL VENOUS BLD VENIPUNCTURE: CPT

## 2018-10-14 PROCEDURE — 700102 HCHG RX REV CODE 250 W/ 637 OVERRIDE(OP): Performed by: INTERNAL MEDICINE

## 2018-10-14 PROCEDURE — 80053 COMPREHEN METABOLIC PANEL: CPT

## 2018-10-14 PROCEDURE — 83550 IRON BINDING TEST: CPT

## 2018-10-14 PROCEDURE — 85025 COMPLETE CBC W/AUTO DIFF WBC: CPT

## 2018-10-14 PROCEDURE — 700111 HCHG RX REV CODE 636 W/ 250 OVERRIDE (IP): Performed by: STUDENT IN AN ORGANIZED HEALTH CARE EDUCATION/TRAINING PROGRAM

## 2018-10-14 PROCEDURE — 87493 C DIFF AMPLIFIED PROBE: CPT

## 2018-10-14 PROCEDURE — 82746 ASSAY OF FOLIC ACID SERUM: CPT

## 2018-10-14 PROCEDURE — 700105 HCHG RX REV CODE 258: Performed by: STUDENT IN AN ORGANIZED HEALTH CARE EDUCATION/TRAINING PROGRAM

## 2018-10-14 PROCEDURE — A9270 NON-COVERED ITEM OR SERVICE: HCPCS | Performed by: INTERNAL MEDICINE

## 2018-10-14 PROCEDURE — 99238 HOSP IP/OBS DSCHRG MGMT 30/<: CPT | Mod: GC | Performed by: INTERNAL MEDICINE

## 2018-10-14 RX ORDER — POTASSIUM CHLORIDE 20 MEQ/1
40 TABLET, EXTENDED RELEASE ORAL ONCE
Status: COMPLETED | OUTPATIENT
Start: 2018-10-14 | End: 2018-10-14

## 2018-10-14 RX ORDER — OMEPRAZOLE 40 MG/1
20 CAPSULE, DELAYED RELEASE ORAL DAILY
Qty: 30 CAP | Refills: 0 | Status: SHIPPED | OUTPATIENT
Start: 2018-10-14

## 2018-10-14 RX ORDER — PREDNISONE 20 MG/1
TABLET ORAL
Qty: 30 TAB | Refills: 0 | Status: SHIPPED | OUTPATIENT
Start: 2018-10-15

## 2018-10-14 RX ORDER — SULFASALAZINE 500 MG/1
1000 TABLET ORAL 2 TIMES DAILY
Qty: 120 TAB | Refills: 3 | Status: SHIPPED | OUTPATIENT
Start: 2018-10-14

## 2018-10-14 RX ADMIN — SODIUM CHLORIDE: 9 INJECTION, SOLUTION INTRAVENOUS at 02:56

## 2018-10-14 RX ADMIN — POTASSIUM CHLORIDE 40 MEQ: 1500 TABLET, EXTENDED RELEASE ORAL at 12:42

## 2018-10-14 RX ADMIN — OMEPRAZOLE 40 MG: 20 CAPSULE, DELAYED RELEASE ORAL at 05:53

## 2018-10-14 RX ADMIN — ACETAMINOPHEN 650 MG: 325 TABLET, FILM COATED ORAL at 06:41

## 2018-10-14 RX ADMIN — SULFASALAZINE 1000 MG: 500 TABLET ORAL at 06:42

## 2018-10-14 RX ADMIN — VITAMIN D, TAB 1000IU (100/BT) 5000 UNITS: 25 TAB at 05:53

## 2018-10-14 RX ADMIN — PREDNISONE 40 MG: 20 TABLET ORAL at 05:53

## 2018-10-14 RX ADMIN — NICOTINE 14 MG: 14 PATCH, EXTENDED RELEASE TRANSDERMAL at 05:55

## 2018-10-14 ASSESSMENT — ENCOUNTER SYMPTOMS
VOMITING: 0
WEIGHT LOSS: 0
BLURRED VISION: 0
DIAPHORESIS: 0
PALPITATIONS: 0
CHILLS: 0
DIARRHEA: 0
SORE THROAT: 0
ABDOMINAL PAIN: 0
NAUSEA: 0
DOUBLE VISION: 0
HEADACHES: 0
SHORTNESS OF BREATH: 0
CONSTIPATION: 0
DIZZINESS: 0
MYALGIAS: 0
NERVOUS/ANXIOUS: 0
BACK PAIN: 0
HEARTBURN: 0
NECK PAIN: 0
BLOOD IN STOOL: 1
ABDOMINAL PAIN: 1
COUGH: 0
DEPRESSION: 0
FEVER: 0
DIARRHEA: 1

## 2018-10-14 ASSESSMENT — PAIN SCALES - GENERAL
PAINLEVEL_OUTOF10: 2
PAINLEVEL_OUTOF10: 4

## 2018-10-14 NOTE — PROGRESS NOTES
Pt has left floor with all belongings. AVS signed and in the chart. Pt request that he walks off the floor on his own to meet family at [facility]. Pt is A&Ox4.

## 2018-10-14 NOTE — NON-PROVIDER
Internal Medicine Interval Note  Note Author: Chandu Ramirez, Student     Name Michael Paul 1975   Age/Sex 42 y.o. male   MRN [id number]   Code Status Full     After 5PM or if no immediate response to page, please call for cross-coverage  Attending/Team: [place] See Patient List for primary contact information  Call [phone] to page    1st Call - Day Intern (R1):   [place] 2nd Call - Day Sr. Resident (R2/R3):   Hong         Reason for interval visit  (Principal Problem)   Crohn's exacerbation    Interval Problem Daily Status Update  (24 hours)   Patient states that he is feeling much better this morning and that he is continuing to improve since his initial admission. He states that he ate some tomato soup and had some milk last night, which upset his stomach a little bit almost immediately after, but this subsided relatively soon. He states that there is still some tenderness in his abdomen. He states the consistency of his stools are about normal and only noted a little bit of blood on his most recent stool. Denies nausea and vomiting.     Review of Systems   Constitutional: Negative for chills, diaphoresis, fever and weight loss.   HENT: Negative for congestion, hearing loss, sore throat and tinnitus.    Eyes: Negative for blurred vision and double vision.   Respiratory: Negative for cough and shortness of breath.    Cardiovascular: Negative for chest pain, palpitations and leg swelling.   Gastrointestinal: Positive for abdominal pain, blood in stool and diarrhea. Negative for constipation, heartburn, nausea and vomiting.   Genitourinary: Negative for dysuria, frequency, hematuria and urgency.   Musculoskeletal: Negative for myalgias.   Skin: Negative for itching and rash.   Neurological: Negative for dizziness and headaches.   Psychiatric/Behavioral: Negative for depression. The patient is not nervous/anxious.        Consultants/Specialty  GI consult  PCP: None, in the  process of moving    Disposition  Discharge, follow up GI outpatient     Quality Measures  Quality-Core Measures   Reviewed items::  Labs reviewed, Medications reviewed and Radiology images reviewed  Oneal catheter::  No Oneal  DVT prophylaxis pharmacological::  Not indicated at this time, ambulatory  DVT prophylaxis - mechanical:  Not indicated at this time, ambulatory  Ulcer Prophylaxis::  Not indicated          Physical Exam       Vitals:    10/13/18 1600 10/13/18 1957 10/14/18 0400 10/14/18 0700   BP: 118/59 118/73 117/75 105/69   Pulse: 92 65 68 73   Resp: 18 18 18 18   Temp: 36.8 °C (98.2 °F) 36.9 °C (98.4 °F) 36.9 °C (98.4 °F) 36.5 °C (97.7 °F)   SpO2: 98% 96% 94%    Weight:       Height:         Body mass index is 21.93 kg/m².    Oxygen Therapy:  Pulse Oximetry: 94 %, O2 (LPM): 0, O2 Delivery: None (Room Air)    Physical Exam   Constitutional: He is oriented to person, place, and time and well-developed, well-nourished, and in no distress. No distress.   HENT:   Head: Normocephalic and atraumatic.   Right Ear: External ear normal.   Left Ear: External ear normal.   Mouth/Throat: Oropharynx is clear and moist. No oropharyngeal exudate.   Eyes: Pupils are equal, round, and reactive to light. Conjunctivae and EOM are normal. Right eye exhibits no discharge. Left eye exhibits no discharge. No scleral icterus.   Neck: Normal range of motion. No thyromegaly present.   Cardiovascular: Normal rate, regular rhythm, normal heart sounds and intact distal pulses.  Exam reveals no gallop and no friction rub.    No murmur heard.  Pulmonary/Chest: Effort normal and breath sounds normal. No respiratory distress. He has no wheezes. He has no rales.   Abdominal: Soft. Bowel sounds are normal. He exhibits no distension and no mass. There is tenderness (all quadrants). There is no rebound and no guarding.   Musculoskeletal: Normal range of motion. He exhibits no edema or tenderness.   Lymphadenopathy:     He has no cervical  adenopathy.   Neurological: He is alert and oriented to person, place, and time. Gait normal.   Skin: Skin is warm and dry. No rash noted. He is not diaphoretic. No erythema. No pallor.   Psychiatric: Mood, memory, affect and judgment normal.         Lab Data Review:         Recent Labs      10/12/18   1154  10/13/18   0213   SODIUM  132*  134*   POTASSIUM  3.8  4.2   CHLORIDE  100  103   CO2  22  22   BUN  18  15   CREATININE  0.90  0.97   CALCIUM  9.3  8.7       Recent Labs      10/12/18   1154  10/13/18   0213   ALTSGPT  14  11   ASTSGOT  19  16   ALKPHOSPHAT  93  88   TBILIRUBIN  0.3  0.4   LIPASE  16   --    GLUCOSE  84  109*       Recent Labs      10/12/18   1154  10/12/18   1306  10/13/18   0213  10/14/18   0744   RBC  4.60*   --   4.43*  4.29*   HEMOGLOBIN  13.6*   --   13.0*  12.4*   HEMATOCRIT  41.3*   --   38.6*  36.9*   PLATELETCT  409   --   349  370   PROTHROMBTM   --   13.4   --    --    APTT   --   32.5   --    --    INR   --   1.01   --    --        Recent Labs      10/12/18   1154  10/13/18   0213  10/14/18   0744   WBC  8.1  7.4  8.8   NEUTSPOLYS  61.40  86.60*  71.60   LYMPHOCYTES  24.10  10.50*  15.50*   MONOCYTES  12.20  2.20  10.40   EOSINOPHILS  1.60  0.00  0.60   BASOPHILS  0.50  0.30  0.30   ASTSGOT  19  16   --    ALTSGPT  14  11   --    ALKPHOSPHAT  93  88   --    TBILIRUBIN  0.3  0.4   --            Assessment/Plan     No new Assessment & Plan notes have been filed under this hospital service since the last note was generated.  Service: Internal Medicine      Michael Paul is a 41 y/o male with a history of Crohn's disease who presented with bloody diarrhea and abdominal pain, currently feeling well and being treated for Crohn's colitis.    #1 Crohn's Disease with Colitis  - CT scan from 10/12/18 shows descending and sigmoid colitis with possible etiologies of infectious, inflammatory, or neoplastic.   - GI consult has been placed. They said the will see him outpatient. Discussed  patient's plans for moving. Will try to see him prior.  - At admission patient's Crohn's Disease Activity Index scores approx. 400 points suggesting moderate-severe active Crohn's.   - Patient has managed his Crohn's disease previously with diet modifications and cannabinoids. Patient is currently being treated with 40mg prednisone PO daily and sulfasalazine 1000mg BID. Patient symptoms are resolving on this regimen, so will continue for now. If worsen or clark snot fully resolve will consider increasing sulfasalazine dosing to 4 - 6 grams or switching to AZA/6-MP, but would like GI consult prior to beginning these therapies.    - Smoking cessation counseling was provided. Smoking is associated with worsening activity in Crohn's and patient is aware.   - IV fluids can be weaned down today if patient can continue to maintain good oral intake.   - Patient has been afebrile and shows no signs for infectious cause and has responded well to current therapy, which makes this activity most consistent with inflammatory etiology.   - Per guidelines patient should have colonoscopy with random biopsy 8 years from diagnosis, which he will be due in 2020. Patient reminded and recommend establishing care in [place] once he moves.     #2 Normocytic Anemia  - Differential includes anemia of chronic disease v. Pernicious anemia v. Blood loss secondary to colitis   - H/H today is 12.4/36.9, MCV 86. Consistent with a normocytic anemia, which is most consistent with acute cause secondary to colitis.   - Iron Panel: Iron, TIBC, % Saturation WNL. Ferritin pending.   - Vitamin B12, Folate pending.     #3 Query Vitamin D Deficiency  - Patient has no complaints or symptoms suggestive of deficiency, but previous 25-OH was 17, would be worth obtaining current level as he does not have PCP or plans for follow up at the moment due to moving.

## 2018-10-14 NOTE — DISCHARGE INSTRUCTIONS
Discharge Instructions    Discharged to home by car with relative. Discharged via walking, hospital escort: Refused.  Special equipment needed: Not Applicable    Be sure to schedule a follow-up appointment with your primary care doctor or any specialists as instructed.     Discharge Plan:   Diet Plan: Discussed  Activity Level: Discussed  Smoking Cessation Offered: Patient Counseled  Confirmed Follow up Appointment: Appointment Scheduled  Confirmed Symptoms Management: Discussed  Medication Reconciliation Updated: Yes  Pneumococcal Vaccine Administered/Refused: Not given - Patient refused pneumococcal vaccine  Influenza Vaccine Indication: Patient Refuses    I understand that a diet low in cholesterol, fat, and sodium is recommended for good health. Unless I have been given specific instructions below for another diet, I accept this instruction as my diet prescription.   Other diet: regular     Special Instructions: None    · Is patient discharged on Warfarin / Coumadin?   No     Depression / Suicide Risk    As you are discharged from this [facility] facility, it is important to learn how to keep safe from harming yourself.    Recognize the warning signs:  · Abrupt changes in personality, positive or negative- including increase in energy   · Giving away possessions  · Change in eating patterns- significant weight changes-  positive or negative  · Change in sleeping patterns- unable to sleep or sleeping all the time   · Unwillingness or inability to communicate  · Depression  · Unusual sadness, discouragement and loneliness  · Talk of wanting to die  · Neglect of personal appearance   · Rebelliousness- reckless behavior  · Withdrawal from people/activities they love  · Confusion- inability to concentrate     If you or a loved one observes any of these behaviors or has concerns about self-harm, here's what you can do:  · Talk about it- your feelings and reasons for harming yourself  · Remove any means that you  might use to hurt yourself (examples: pills, rope, extension cords, firearm)  · Get professional help from the community (Mental Health, Substance Abuse, psychological counseling)  · Do not be alone:Call your Safe Contact- someone whom you trust who will be there for you.  · Call your local CRISIS HOTLINE 112-6502 or 218-123-7234  · Call your local Children's Mobile Crisis Response Team [place] (226) 698-7395 or wwwBebo  · Call the toll free National Suicide Prevention Hotlines   · National Suicide Prevention Lifeline 048-963-OVNA (3401)  · [employer] Line Network 800-SUICIDE (693-6863)  • Regional enteritis Site-  (Small, large, small and large intestine, unspecified)  • Regional enteritis without complication)  • Regional enteritis with complication (abscess, fistula, intestinal obstruction, rectal bleeding, unspecified)  • Other explanation of clinical findings  • Findings of no clinical significance  • Unable to determine      Regional enteritis Site-  (Small, large, small and large intestine, unspecified)  Regional enteritis without complication)  Regional enteritis with complication (abscess, fistula, intestinal obstruction, rectal bleeding, unspecified)  Other explanation of clinical findings  Findings of no clinical significance  Unable to determine    Crohn Disease  Crohn disease is a long-lasting (chronic) disease that affects your gastrointestinal (GI) tract. It often causes irritation and swelling (inflammation) in your small intestine and the beginning of your large intestine. However, it can affect any part of your GI tract. Crohn disease is part of a group of illnesses that are known as inflammatory bowel disease (IBD).  Crohn disease may start slowly and get worse over time. Symptoms may come and go. They may also disappear for months or even years at a time (remission).  What are the causes?  The exact cause of Crohn disease is not known. It may be a response that causes your  body's defense system (immune system) to mistakenly attack healthy cells and tissues (autoimmune response). Your genes and your environment may also play a role.  What increases the risk?  You may be at greater risk for Crohn disease if you:  Have other family members with Crohn disease or another IBD.  Use any tobacco products, including cigarettes, chewing tobacco, or electronic cigarettes.  Are in your 20s.  Have Eastern  ancestry.  What are the signs or symptoms?  The main signs and symptoms of Crohn disease involve your GI tract. These include:  Diarrhea.  Rectal bleeding.  An urgent need to move your bowels.  The feeling that you are not finished having a bowel movement.  Abdominal pain or cramping.  Constipation.  General signs and symptoms of Crohn disease may also include:  Unexplained weight loss.  Fatigue.  Fever.  Nausea.  Loss of appetite.  Joint pain  Changes in vision.  Red bumps on your skin.  How is this diagnosed?  Your health care provider may suspect Crohn disease based on your symptoms and your medical history. Your health care provider will do a physical exam. You may need to see a health care provider who specializes in diseases of the digestive tract (gastroenterologist). You may also have tests to help your health care providers make a diagnosis. These may include:  Blood tests.  Stool sample tests.  Imaging tests, such as X-rays and CT scans.  Tests to examine the inside of your intestines using a long, flexible tube that has a light and a camera on the end (endoscopy or colonoscopy).  A procedure to take tissue samples from inside your bowel (biopsy) to be examined under a microscope.  How is this treated?  There is no cure for Crohn disease. Treatment will focus on managing your symptoms. Crohn disease affects each person differently. Your treatment may include:  Resting your bowels. Drinking only clear liquids or getting nutrition through an IV for a period of time gives your  bowels a chance to heal because they are not passing stools.  Medicines. These may be used alone or in combination (combination therapy). These may include antibiotic medicines. You may be given medicines that help to:  Reduce inflammation.  Control your immune system activity.  Fight infections.  Relieve cramps and prevent diarrhea.  Control your pain.  Surgery. You may need surgery if:  Medicines and other treatments are no longer working.  You develop complications from severe Crohn disease.  A section of your intestine becomes so damaged that it needs to be removed.  Follow these instructions at home:  Take medicines only as directed by your health care provider.  If you were prescribed an antibiotic medicine, finish it all even if you start to feel better.  Keep all follow-up visits as directed by your health care provider. This is important.  Talk with your health care provider about changing your diet. This may help your symptoms. Your health care provide may recommend changes, such as:  Drinking more fluids.  Avoiding milk and other foods that contain lactose.  Eating a low-fat diet.  Avoiding high-fiber foods, such as popcorn and nuts.  Avoiding carbonated beverages, such as soda.  Eating smaller meals more often rather than eating large meals.  Keeping a food diary to identify foods that make your symptoms better or worse.  Do not use any tobacco products, including cigarettes, chewing tobacco, or electronic cigarettes. If you need help quitting, ask your health care provider.  Limit alcohol intake to no more than 1 drink per day for nonpregnant women and 2 drinks per day for men. One drink equals 12 ounces of beer, 5 ounces of wine, or 1½ ounces of hard liquor.  Exercise daily or as directed by your health care provider.  Contact a health care provider if:  You have diarrhea, abdominal cramps, and other gastrointestinal problems that are present almost all of the time.  Your symptoms do not improve with  treatment.  You continue to lose weight.  You develop a rash or sores on your skin.  You develop eye problems.  You have a fever.  Your symptoms get worse.  You develop new symptoms.  Get help right away if:  You have bloody diarrhea.  You develop severe abdominal pain.  You cannot pass stools.  This information is not intended to replace advice given to you by your health care provider. Make sure you discuss any questions you have with your health care provider.  Document Released: 09/27/2006 Document Revised: 04/27/2017 Document Reviewed: 08/05/2015  [employer] Interactive Patient Education © 2017 [employer]        Colitis  Introduction  Colitis is inflammation of the colon. Colitis may last a short time (acute) or it may last a long time (chronic).  What are the causes?  This condition may be caused by:  · Viruses.  · Bacteria.  · Reactions to medicine.  · Certain autoimmune diseases, such as Crohn disease or ulcerative colitis.  What are the signs or symptoms?  Symptoms of this condition include:  · Diarrhea.  · Passing bloody or tarry stool.  · Pain.  · Fever.  · Vomiting.  · Tiredness (fatigue).  · Weight loss.  · Bloating.  · Sudden increase in abdominal pain.  · Having fewer bowel movements than usual.  How is this diagnosed?  This condition is diagnosed with a stool test or a blood test. You may also have other tests, including X-rays, a CT scan, or a colonoscopy.  How is this treated?  Treatment may include:  · Resting the bowel. This involves not eating or drinking for a period of time.  · Fluids that are given through an IV tube.  · Medicine for pain and diarrhea.  · Antibiotic medicines.  · Cortisone medicines.  · Surgery.  Follow these instructions at home:  Eating and drinking  · Follow instructions from your health care provider about eating or drinking restrictions.  · Drink enough fluid to keep your urine clear or pale yellow.  · Work with a dietitian to determine which foods cause your condition  to flare up.  · Avoid foods that cause flare-ups.  · Eat a well-balanced diet.  Medicines  · Take over-the-counter and prescription medicines only as told by your health care provider.  · If you were prescribed an antibiotic medicine, take it as told by your health care provider. Do not stop taking the antibiotic even if you start to feel better.  General instructions  · Keep all follow-up visits as told by your health care provider. This is important.  Contact a health care provider if:  · Your symptoms do not go away.  · You develop new symptoms.  Get help right away if:  · You have a fever that does not go away with treatment.  · You develop chills.  · You have extreme weakness, fainting, or dehydration.  · You have repeated vomiting.  · You develop severe pain in your abdomen.  · You pass bloody or tarry stool.  This information is not intended to replace advice given to you by your health care provider. Make sure you discuss any questions you have with your health care provider.  Document Released: 01/25/2006 Document Revised: 05/25/2017 Document Reviewed: 04/11/2016  © 2017 [employer]    Smoking and Your Digestive System  Cigarette smoking causes many life-threatening diseases. These include lung cancer, other cancers, emphysema, and heart disease. About 430,000 deaths each year are directly caused by cigarette smoking. Smoking results in disease-causing changes in all parts of the body. This includes the digestive system. This can cause serious effects, since the digestive system converts foods into nutrients the body needs to live.  Smoking has been shown to have harmful effects on all parts of the digestive system. It adds to common disorders, such as heartburn and peptic ulcers. It also increases the risk of Crohn's disease, and possibly gallstones. Smoking seems to affect the liver by changing the way it handles drugs and alcohol and removes them. In fact, there seems to be enough evidence to stop smoking  based solely on digestive distress. Some of the harmful effects of smoking are:  · Heartburn (acid reflux).  Heartburn happens when acidic juices from the stomach splash into the esophagus, which has a more sensitive and less acid-resistant lining than the stomach. Normally, a muscular valve at the lower end of the esophagus keeps out the acid solution in the stomach. Smoking decreases the strength of the esophageal valve and its ability to keep out acidic stomach contents. This allows stomach acid reflux, or flow backward into the esophagus.   · Smoking also seems to promote the movement of bile salts from the intestine to the stomach. This makes stomach acids more harmful.   · A peptic ulcer is an open sore in the lining of the stomach or duodenum (first part of the small intestine). The exact cause of ulcers is not known. A link between smoking cigarettes and ulcers, especially duodenal ulcers, does exist. Ulcers are more likely to occur, less likely to heal, and more likely to cause death in smokers than in nonsmokers.   · Some research suggests that smoking might increase a person's risk of infection with the bacterium Helicobacter pylori (H. pylori). Most peptic ulcers are caused by this bacterium.   · Stomach acid is also important in causing ulcers. Normally, most of this acid is buffered (neutralized) by the food we eat. Most of the unbuffered acid that enters the duodenum is quickly neutralized by sodium bicarbonate. This is a naturally occurring alkali, produced by the pancreas. Some studies show that smoking reduces the bicarbonate produced by the pancreas. This interferes with the neutralization of acid in the duodenum. Other studies suggest that chronic cigarette smoking may increase the amount of acid produced by the stomach.   · Whatever causes the link between smoking and ulcers, two points have been repeatedly shown. People who smoke are more likely to develop an ulcer, especially a duodenal ulcer.  Ulcers in smokers are less likely to heal quickly in response to otherwise effective treatment. This research strongly suggests that a person with an ulcer should stop smoking.   · The liver is an important organ with many tasks. One task of the liver is to prepare drugs, alcohol, and other toxins for elimination (removal) from the body. There is evidence that smoking alters the ability of the liver to effectively handle such substances. In some cases, this may influence the dose of medicine needed to treat an illness. Some research suggests that smoking can aggravate and speed up the course of liver disease caused by excessive alcohol intake.   · Studies have shown that smokers have weaker or less frequent stomach contractions while smoking, which can cause less efficient digestion.   · Crohn's disease causes inflammation deep in the lining of the intestine. The disease causes pain and diarrhea. It usually affects the small intestine, but it can occur anywhere in the digestive tract. Research shows that current and former smokers have a higher risk of developing Crohn's disease than nonsmokers. Among people with the disease, smoking is linked with a higher rate of relapse, repeat surgery, and immunosuppressive treatment. In all areas, the risk for women who are current or former smokers is slightly higher than for men. Why smoking increases the risk of Crohn's disease is unknown.   · Several studies suggest that smoking may increase the risk of developing gallstones. The risk may be higher for women. Research results on this topic are not consistent. More studies are needed.   · Oral (lip and mouth) cancer and cancer of the pharynx (throat) and the esophagus are caused by smoking. Smoking may be associated with pancreatic cancer.   · Some of the effects of smoking on the digestive system seem to be short-lived. For example, the effect of smoking on bicarbonate production by the pancreas does not appear to last.  Half an hour after smoking, the production of bicarbonate returns to normal. The effects of smoking on how the liver handles drugs also disappear when a person stops smoking. However, people who no longer smoke still remain at risk for Crohn's disease.   Document Released: 11/30/2005 Document Revised: 03/11/2013 Document Reviewed: 10/04/2010  [employer] Patient Information ©2013 [employer].

## 2018-10-14 NOTE — PROGRESS NOTES
Assumed care of Pt at shift change. Pt is A&Ox4. Pt reports pain 2/10 and was given rest. Call light with in reach. Traction socks on pt and bed in lowest position.

## 2018-10-14 NOTE — CARE PLAN
Problem: Safety  Goal: Will remain free from falls    Intervention: Assess risk factors for falls  Pt assessed for fall risk. He is steady on his feet and is considered not a fall risk by the syed shahab scale.       Problem: Venous Thromboembolism (VTW)/Deep Vein Thrombosis (DVT) Prevention:  Goal: Patient will participate in Venous Thrombosis (VTE)/Deep Vein Thrombosis (DVT)Prevention Measures    Intervention: Assess and monitor for anticoagulation complications  Pt assessed for anticoagulation issues no issues noted. Pt is regularly ambulating.

## 2018-10-14 NOTE — CARE PLAN
Problem: Bowel/Gastric:  Goal: Normal bowel function is maintained or improved  Outcome: PROGRESSING AS EXPECTED  Pt educated on diet and importance of medications in improving bowel function.     Problem: Mobility  Goal: Risk for activity intolerance will decrease  Outcome: PROGRESSING AS EXPECTED  Pt up self and steady on feet. Pt able to ambulate to bathroom.

## 2018-10-14 NOTE — DISCHARGE SUMMARY
Internal Medicine Discharge Summary  Note Author: Riley Pitts M.D.       Admit Date:  10/12/2018       Discharge Date:   10/14/2018    Service:   R Internal Medicine orange team  Attending Physician(s):   Dr. José Miguel Olivier MD      Senior Resident(s):   Dr. Sushant Pitts MD  Eriberto Resident(s):   Dr. Luly Yepez M.D.  PCP: Pcp Pt States None      Primary Diagnosis:     Acute flare of Crohn's colitis    Secondary Diagnoses:                Active Problems:    Crohn's colitis (HCC) (Chronic) POA: Yes    Anemia POA: Unknown      Overview:         Resolved Problems:    * No resolved hospital problems. *      Hospital Summary (Brief Narrative):          42 Y old gentleman with  Hx of Crohn's colitis diagnosed in 2013 presented with hx of abdominal pain and bloody diarrhea for about one week.  He reported about 7-8 lose bloody bowel movements each day.  The patient was managed previously with steroids and sulfasalazine but he lost his follow-up with GI. In the last 2 years, his crohn's disease was stable, seems to be in remission and he had no GI symptoms, and he was not on any medications for that until the last one week when he developed this bloody diarrhea.  On admission, his vitals were stable, physical examination was unremarkable apart from mild tenderness and no abdomen.  His labs revealed mild anemia, hemoglobin 13.6, normal white blood cells, chemistry panel including renal function, electrolytes and liver enzymes are within acceptable limits.  His ESR was high 62, C-reactive protein 4.9, high also.  CT abdomen showed diffuse circumferential thickening involving the descending and sigmoid colon. There is mucosal hyperenhancement and mesenteric hyperemia. Findings are consistent with colitis of descending and sigmoid colon.  Sent also for C. difficile colitis test, but the result is still pending.    Managed as a case of flare of Crohn's colitis, started with steroid, received methylprednisone  125 mg IV 1 dose in the ED and then shifted to 40 mg prednisone orally, started also on sulfasalazine 1 g twice daily, bowel rest and we advanced the diet gradually.  The patient has been improving, bloody diarrhea stopped and he is tolerating regular diet.  I talked with GI consultant from [facility] and recommended follow-up as outpatient with GI.    Discharged on prednisone p.o. starting with 40 mg daily for 1 week, and then gradual tapering over 5 weeks, in addition to sulfasalazine 1 g twice daily and omeprazole p.o. for GI prophylaxis.    Needs follow-up with PCP and GI consultant.      From chart review  The patient has sigmoidoscopy done by Dr. Alex Hodgson M.D on 11/29/2013 and revealed  -active colitis from 15 cm onward to extent of exam at 55 cm  -relative rectal sparing although active proctitis in distal 2-3 cm of rectum             Patient /Hospital Summary (Details -- Problem Oriented) :          Crohn's colitis (HCC)   See hospital summary          Anemia       Hemoglobin 12-13.  Mild, symptomatic and stable.  Needs follow-up as outpatient            Consultants:     None.  We discussed the case with GI consulted from [facility] on phone    Procedures:        None    Imaging/ Testing:        CT abdomen showed  Descending and sigmoid colitis. Infectious/inflammatory/neoplastic etiologies are possible.    Discharge Medications:         Medication Reconciliation: Completed     Micheal Paul   Home Medication Instructions YE:[id number]    Printed on:10/14/18 1235   Medication Information                      acetaminophen (TYLENOL) 500 MG Tab  Take 1,000 mg by mouth 2 Times a Day.             Cholecalciferol (VITAMIN D3) 5000 units Cap  Take 1 Cap by mouth every evening.             omeprazole (PRILOSEC) 40 MG delayed-release capsule  Take 1 Cap by mouth every day.             predniSONE (DELTASONE) 20 MG Tab  Take 40 mg daily for 5 days, then 30 mg daily for 7 days, then 20 mg daily  for 7 days, then 10  Mg for 7 days then 5 mg for 7 days,then stop             sulfaSALAzine (AZULFIDINE) 500 MG Tab  Take 2 Tabs by mouth 2 Times a Day.                       Disposition:   Discharged home    Diet:   Regular diet    Activity:   As tolerated    Instructions:         The patient was instructed to return to the ER in the event of worsening symptoms. I have counseled the patient on the importance of compliance and the patient has agreed to proceed with all medical recommendations and follow up plan indicated above.   The patient understands that all medications come with benefits and risks. Risks may include permanent injury or death and these risks can be minimized with close reassessment and monitoring.        Primary Care Provider:      Discharge summary faxed to primary care provider:  Deferred  Copy of discharge summary given to the patient: Deferred      Follow up appointment details :        Needs follow-up with GI, digestive health within the next week  Needs follow-up with PCP.  The patient has no insurance so instructed to [facility]    Pending Studies:        C. difficile test    Time spent on discharge day patient visit, preparing discharge paperwork and arranging for patient follow up.    Summary of follow up issues:   Follow-up with GI for his Crohn's colitis, for maintenance treatment  Follow-up with PCP for his Crohn's colitis, steroid medication, his chronic anemia  Follow-up for the results of C. difficile test    Discharge Time (Minutes) :   40 minutes  Hospital Course Type: Inpatient Stay < 2 midnights, patient recovered more rapidly than anticipated      Condition on Discharge   stable, alert and ambulatory  ______________________________________________________________________    Interval history/exam for day of discharge:    Bloody diarrhea resolved, abdominal pain is improving, tolerating regular diet.    Vitals:    10/13/18 1600 10/13/18 1957 10/14/18 0400  10/14/18 0700   BP: 118/59 118/73 117/75 105/69   Pulse: 92 65 68 73   Resp: 18 18 18 18   Temp: 36.8 °C (98.2 °F) 36.9 °C (98.4 °F) 36.9 °C (98.4 °F) 36.5 °C (97.7 °F)   SpO2: 98% 96% 94%    Weight:       Height:         Weight/BMI: Body mass index is 21.93 kg/m².  Pulse Oximetry: 94 %, O2 (LPM): 0, O2 Delivery: None (Room Air)    General: Not in acute distress  CVS: Normal S1, S2.  No rub or gallop or murmur  PULM clear to auscultation, harsh vesicular breathing,: No wheezes or crackles.  Abdomen soft, Tenderness resolved, bowel sounds are positive, no organomegaly.    Most Recent Labs:    Lab Results   Component Value Date/Time    WBC 8.8 10/14/2018 07:44 AM    RBC 4.29 (L) 10/14/2018 07:44 AM    HEMOGLOBIN 12.4 (L) 10/14/2018 07:44 AM    HEMATOCRIT 36.9 (L) 10/14/2018 07:44 AM    MCV 86.0 10/14/2018 07:44 AM    MCH 28.9 10/14/2018 07:44 AM    MCHC 33.6 (L) 10/14/2018 07:44 AM    MPV 8.4 (L) 10/14/2018 07:44 AM    NEUTSPOLYS 71.60 10/14/2018 07:44 AM    LYMPHOCYTES 15.50 (L) 10/14/2018 07:44 AM    MONOCYTES 10.40 10/14/2018 07:44 AM    EOSINOPHILS 0.60 10/14/2018 07:44 AM    BASOPHILS 0.30 10/14/2018 07:44 AM    HYPOCHROMIA 1+ 07/21/2015 04:21 AM    ANISOCYTOSIS 1+ 06/21/2017 01:04 AM      Lab Results   Component Value Date/Time    SODIUM 134 (L) 10/13/2018 02:13 AM    POTASSIUM 4.2 10/13/2018 02:13 AM    CHLORIDE 103 10/13/2018 02:13 AM    CO2 22 10/13/2018 02:13 AM    GLUCOSE 109 (H) 10/13/2018 02:13 AM    BUN 15 10/13/2018 02:13 AM    CREATININE 0.97 10/13/2018 02:13 AM      Lab Results   Component Value Date/Time    ALTSGPT 11 10/13/2018 02:13 AM    ASTSGOT 16 10/13/2018 02:13 AM    ALKPHOSPHAT 88 10/13/2018 02:13 AM    TBILIRUBIN 0.4 10/13/2018 02:13 AM    LIPASE 16 10/12/2018 11:54 AM    ALBUMIN 3.8 10/13/2018 02:13 AM    GLOBULIN 3.9 (H) 10/13/2018 02:13 AM    INR 1.01 10/12/2018 01:06 PM    MACROCYTOSIS 1+ 10/16/2016 02:50 AM     Lab Results   Component Value Date/Time    PROTHROMBTM 13.4 10/12/2018 01:06  PM    INR 1.01 10/12/2018 01:06 PM

## 2021-09-01 LAB
ALBUMIN SERPL BCP-MCNC: 4.1 G/DL (ref 3.2–4.9)
ALBUMIN/GLOB SERPL: 1 G/DL
ALP SERPL-CCNC: 105 U/L (ref 30–99)
ALT SERPL-CCNC: 19 U/L (ref 2–50)
ANION GAP SERPL CALC-SCNC: 14 MMOL/L (ref 7–16)
AST SERPL-CCNC: 26 U/L (ref 12–45)
BASOPHILS # BLD AUTO: 0.6 % (ref 0–1.8)
BASOPHILS # BLD: 0.05 K/UL (ref 0–0.12)
BILIRUB SERPL-MCNC: 0.2 MG/DL (ref 0.1–1.5)
BUN SERPL-MCNC: 13 MG/DL (ref 8–22)
CALCIUM SERPL-MCNC: 9.3 MG/DL (ref 8.5–10.5)
CHLORIDE SERPL-SCNC: 100 MMOL/L (ref 96–112)
CO2 SERPL-SCNC: 22 MMOL/L (ref 20–33)
CREAT SERPL-MCNC: 0.82 MG/DL (ref 0.5–1.4)
EOSINOPHIL # BLD AUTO: 0.05 K/UL (ref 0–0.51)
EOSINOPHIL NFR BLD: 0.6 % (ref 0–6.9)
ERYTHROCYTE [DISTWIDTH] IN BLOOD BY AUTOMATED COUNT: 51.4 FL (ref 35.9–50)
GLOBULIN SER CALC-MCNC: 4.2 G/DL (ref 1.9–3.5)
GLUCOSE SERPL-MCNC: 74 MG/DL (ref 65–99)
HCT VFR BLD AUTO: 40.6 % (ref 42–52)
HGB BLD-MCNC: 13 G/DL (ref 14–18)
IMM GRANULOCYTES # BLD AUTO: 0.02 K/UL (ref 0–0.11)
IMM GRANULOCYTES NFR BLD AUTO: 0.2 % (ref 0–0.9)
LIPASE SERPL-CCNC: 44 U/L (ref 11–82)
LYMPHOCYTES # BLD AUTO: 1.07 K/UL (ref 1–4.8)
LYMPHOCYTES NFR BLD: 13.2 % (ref 22–41)
MCH RBC QN AUTO: 28.7 PG (ref 27–33)
MCHC RBC AUTO-ENTMCNC: 32 G/DL (ref 33.7–35.3)
MCV RBC AUTO: 89.6 FL (ref 81.4–97.8)
MONOCYTES # BLD AUTO: 0.74 K/UL (ref 0–0.85)
MONOCYTES NFR BLD AUTO: 9.1 % (ref 0–13.4)
NEUTROPHILS # BLD AUTO: 6.16 K/UL (ref 1.82–7.42)
NEUTROPHILS NFR BLD: 76.3 % (ref 44–72)
NRBC # BLD AUTO: 0 K/UL
NRBC BLD-RTO: 0 /100 WBC
PLATELET # BLD AUTO: 435 K/UL (ref 164–446)
PMV BLD AUTO: 8.1 FL (ref 9–12.9)
POTASSIUM SERPL-SCNC: 3.9 MMOL/L (ref 3.6–5.5)
PROT SERPL-MCNC: 8.3 G/DL (ref 6–8.2)
RBC # BLD AUTO: 4.53 M/UL (ref 4.7–6.1)
SODIUM SERPL-SCNC: 136 MMOL/L (ref 135–145)
WBC # BLD AUTO: 8.1 K/UL (ref 4.8–10.8)

## 2021-09-01 PROCEDURE — 80053 COMPREHEN METABOLIC PANEL: CPT

## 2021-09-01 PROCEDURE — 83690 ASSAY OF LIPASE: CPT

## 2021-09-01 PROCEDURE — 85025 COMPLETE CBC W/AUTO DIFF WBC: CPT

## 2021-09-01 PROCEDURE — 99284 EMERGENCY DEPT VISIT MOD MDM: CPT

## 2021-09-01 ASSESSMENT — PAIN DESCRIPTION - DESCRIPTORS: DESCRIPTORS: CRAMPING

## 2021-09-02 ENCOUNTER — HOSPITAL ENCOUNTER (EMERGENCY)
Facility: MEDICAL CENTER | Age: 46
End: 2021-09-02
Attending: EMERGENCY MEDICINE

## 2021-09-02 VITALS
BODY MASS INDEX: 20.83 KG/M2 | OXYGEN SATURATION: 96 % | HEIGHT: 67 IN | RESPIRATION RATE: 18 BRPM | DIASTOLIC BLOOD PRESSURE: 91 MMHG | WEIGHT: 132.72 LBS | SYSTOLIC BLOOD PRESSURE: 114 MMHG | HEART RATE: 80 BPM | TEMPERATURE: 98.4 F

## 2021-09-02 DIAGNOSIS — K50.90 CROHN'S DISEASE WITHOUT COMPLICATION, UNSPECIFIED GASTROINTESTINAL TRACT LOCATION (HCC): ICD-10-CM

## 2021-09-02 DIAGNOSIS — K52.9 COLITIS: Primary | ICD-10-CM

## 2021-09-02 PROCEDURE — A9270 NON-COVERED ITEM OR SERVICE: HCPCS | Performed by: EMERGENCY MEDICINE

## 2021-09-02 PROCEDURE — 700111 HCHG RX REV CODE 636 W/ 250 OVERRIDE (IP): Performed by: EMERGENCY MEDICINE

## 2021-09-02 PROCEDURE — 700102 HCHG RX REV CODE 250 W/ 637 OVERRIDE(OP): Performed by: EMERGENCY MEDICINE

## 2021-09-02 RX ORDER — AMOXICILLIN AND CLAVULANATE POTASSIUM 875; 125 MG/1; MG/1
1 TABLET, FILM COATED ORAL 2 TIMES DAILY
Qty: 10 TABLET | Refills: 0 | Status: SHIPPED | OUTPATIENT
Start: 2021-09-02 | End: 2021-09-07

## 2021-09-02 RX ORDER — ONDANSETRON 4 MG/1
4 TABLET, ORALLY DISINTEGRATING ORAL ONCE
Status: COMPLETED | OUTPATIENT
Start: 2021-09-02 | End: 2021-09-02

## 2021-09-02 RX ORDER — HYDROCODONE BITARTRATE AND ACETAMINOPHEN 5; 325 MG/1; MG/1
1 TABLET ORAL ONCE
Status: COMPLETED | OUTPATIENT
Start: 2021-09-02 | End: 2021-09-02

## 2021-09-02 RX ORDER — PROMETHAZINE HYDROCHLORIDE 25 MG/1
25 TABLET ORAL EVERY 6 HOURS PRN
Qty: 30 TABLET | Refills: 0 | Status: SHIPPED | OUTPATIENT
Start: 2021-09-02

## 2021-09-02 RX ADMIN — ONDANSETRON 4 MG: 4 TABLET, ORALLY DISINTEGRATING ORAL at 02:50

## 2021-09-02 RX ADMIN — HYDROCODONE BITARTRATE AND ACETAMINOPHEN 1 TABLET: 5; 325 TABLET ORAL at 02:50

## 2021-09-02 ASSESSMENT — ENCOUNTER SYMPTOMS
SORE THROAT: 0
HEADACHES: 0
FEVER: 0
CHILLS: 0
ABDOMINAL PAIN: 1
NECK PAIN: 0
BLOOD IN STOOL: 1
SHORTNESS OF BREATH: 0
VOMITING: 1
DIARRHEA: 1
COUGH: 0

## 2021-09-02 ASSESSMENT — LIFESTYLE VARIABLES: SUBSTANCE_ABUSE: 0

## 2021-09-02 NOTE — ED TRIAGE NOTES
Chief Complaint   Patient presents with   • Abdominal Pain   • Nausea   • Diarrhea     Patient ambulatory to triage. States that he has been having a Chrons flare-up for several weeks. States that he thought that he would get over it, but is now having bloody diarrhea, nausea, and lower abdominal cramping.

## 2021-09-02 NOTE — ED PROVIDER NOTES
ED Provider Note     2021  3:28 AM     Means of Arrival: Walk In  History obtained by: patient  Limitations:none  PCP: none  CODE STATUS: Full    CHIEF COMPLAINT  Chief Complaint   Patient presents with   • Abdominal Pain   • Nausea   • Diarrhea       \[facility] [place] [facility]\""  Michael Paul is a 45 y.o. male who presents with concerns of abdominal cramping, loose stools for over 1 week.  He has had symptoms that are consistent with previous Crohn's flare.  Intermittently seeing some bright red blood in his stools.  Having more frequent loose stools than usual.  He was with his friend 2 days ago who had nausea vomiting diarrhea symptoms and the next day he developed similar symptoms.  He says he had 7 watery bowel movements today.  He has seen some bright red streaks of blood in the stool.  He has not recorded a fever.  No Crohn's flares or symptoms for the past few years.  Has not been to GI clinic for at least 6 years.     REVIEW OF SYSTEMS  Review of Systems   Constitutional: Negative for chills and fever.   HENT: Negative for sore throat and tinnitus.    Respiratory: Negative for cough and shortness of breath.    Cardiovascular: Negative for chest pain.   Gastrointestinal: Positive for abdominal pain, blood in stool, diarrhea and vomiting.   Genitourinary: Negative for dysuria.   Musculoskeletal: Negative for neck pain.   Neurological: Negative for headaches.   Psychiatric/Behavioral: Negative for substance abuse.   All other systems reviewed and are negative.    See HPI for further details.    PAST MEDICAL HISTORY   has a past medical history of Crohn's colitis (HCC).    SOCIAL HISTORY  Social History     Tobacco Use   • Smoking status: Current Every Day Smoker     Packs/day: 0.50     Years: 15.00     Pack years: 7.50     Types: Cigarettes     Last attempt to quit: 2016     Years since quittin.0   • Smokeless tobacco: Never Used   Substance and Sexual Activity   • Alcohol use: Yes     Comment: occasional   • Drug  "use: Yes     Types: Inhaled     Comment: jazmyne daily   • Sexual activity: Not on file       SURGICAL HISTORY   has a past surgical history that includes sigmoidoscopy flexible with biopsy (11/29/2013) and irrigation & debridement general (Right, 7/17/2015).    CURRENT MEDICATIONS  Home Medications     Reviewed by Anni Guillermo R.N. (Registered Nurse) on 09/01/21 at 2009  Med List Status: Partial   Medication Last Dose Status   acetaminophen (TYLENOL) 500 MG Tab  Active   Cholecalciferol (VITAMIN D3) 5000 units Cap  Active   omeprazole (PRILOSEC) 40 MG delayed-release capsule  Active   predniSONE (DELTASONE) 20 MG Tab  Active   sulfaSALAzine (AZULFIDINE) 500 MG Tab  Active                ALLERGIES  Allergies   Allergen Reactions   • Prednisone Hives     \"causes infection\"        PHYSICAL EXAM  VITAL SIGNS: /91   Pulse 80   Temp 36.9 °C (98.4 °F) (Temporal)   Resp 18   Ht 1.702 m (5' 7\")   Wt 60.2 kg (132 lb 11.5 oz)   SpO2 96%   BMI 20.79 kg/m²     Pulse ox interpretation: I interpret this pulse ox as normal.  Constitutional: Alert in no apparent distress.  HENT: No signs of trauma, Bilateral external ears normal, Nose normal.   Eyes: Pupils are equal, Conjunctiva normal, Non-icteric.   Neck: Normal range of motion, No tenderness, Supple, No stridor.   Lymphatic: No lymphadenopathy noted.   Cardiovascular: Regular rate and rhythm, no murmurs. Symmetric distal pulses. No cyanosis of extremities. No peripheral edema of extremities.  Thorax & Lungs: Normal breath sounds, No respiratory distress, No wheezing, No chest tenderness.   Abdomen: Left lower quadrant tenderness mild guarding.  Skin: Warm, Dry, No erythema, No rash.   Back: No midline bony tenderness, No CVA tenderness.   Musculoskeletal: Good range of motion in all major joints. No tenderness to palpation or major deformities noted.   Neurologic: Alert , Normal motor function, Normal sensory function, No focal deficits noted.   Psychiatric: " Affect normal, Judgment normal, Mood normal.   Physical Exam      DIAGNOSTIC STUDIES / PROCEDURES    LABS  Pertinent Labs & Imaging studies reviewed. (See chart for details)    RADIOLOGY  Pertinent Labs & Imaging studies reviewed. (See chart for details)    COURSE & MEDICAL DECISION MAKING  Pertinent Labs & Imaging studies reviewed. (See chart for details)    3:28 AM This is an emergent evaluation of a  45 y.o. male history of Crohn's disease who presents with concerns of abdominal cramping, intermittent bright red blood in stool, diarrhea.  He feels like this is a Crohn's flare.  Pain has been more localized left lower quadrant recently.  He had a friend who had similar GI symptoms.  Has not had a treatment for Crohn's in nearly 6 years.  Previously went to GI consultants here in [place].  I concerns for likely colitis related to Crohn's, possible diverticulitis.  Labs ordered which show white count of 8.1, normal hemoglobin hematocrit, acceptable metabolic panel and a normal lipase.  Given his symptoms that are very consistent with colitis I will treat with Augmentin.  I considered imaging but symptoms are so consistent with colitis that I felt that imaging would not have a significant impact on management.  If there had been a very high white count or fever I would have pursued imaging to look for signs of intra-abdominal abscess.  He did inquire about steroids and typically I would not prescribe him steroids from the emergency department since he has not followed up with GI for several years.  I have put a referral for GI and given him the contact information for the clinic he previously went to.  I have asked him to call later tomorrow to make an appointment.  I have also prescribed promethazine to help with the nausea he is having.  I have asked him to return if his symptoms are not improving in the next several days.  At that point he may need more urgent GI consultation, possible steroid treatment.    The  patient will return for worsening symptoms and is stable at the time of discharge. The patient verbalizes understanding. Guidance was provided on appropriate use of medications including driving under the influence, overdose, and side effects.         FINAL IMPRESSION    ICD-10-CM   1. Colitis  K52.9   2. Crohn's disease without complication, unspecified gastrointestinal tract location (HCC)  K50.90        This dictation was created using voice recognition software. The accuracy of the dictation is limited to the abilities of the software. I expect there may be some errors of grammar and possibly content. The nursing notes were reviewed and certain aspects of this information were incorporated into this note.    Electronically signed by: Juan José Weaver II, M.D., 9/2/2021 3:28 AM

## 2021-10-09 ENCOUNTER — APPOINTMENT (OUTPATIENT)
Dept: RADIOLOGY | Facility: MEDICAL CENTER | Age: 46
End: 2021-10-09
Attending: EMERGENCY MEDICINE

## 2021-10-09 ENCOUNTER — HOSPITAL ENCOUNTER (EMERGENCY)
Facility: MEDICAL CENTER | Age: 46
End: 2021-10-09
Attending: EMERGENCY MEDICINE

## 2021-10-09 VITALS
OXYGEN SATURATION: 97 % | TEMPERATURE: 97.2 F | HEART RATE: 98 BPM | DIASTOLIC BLOOD PRESSURE: 67 MMHG | SYSTOLIC BLOOD PRESSURE: 112 MMHG | HEIGHT: 67 IN | RESPIRATION RATE: 16 BRPM | BODY MASS INDEX: 20.1 KG/M2 | WEIGHT: 128.09 LBS

## 2021-10-09 DIAGNOSIS — K62.89 RECTAL PAIN: ICD-10-CM

## 2021-10-09 LAB
ALBUMIN SERPL BCP-MCNC: 3.6 G/DL (ref 3.2–4.9)
ALBUMIN/GLOB SERPL: 0.9 G/DL
ALP SERPL-CCNC: 101 U/L (ref 30–99)
ALT SERPL-CCNC: 9 U/L (ref 2–50)
ANION GAP SERPL CALC-SCNC: 9 MMOL/L (ref 7–16)
AST SERPL-CCNC: 13 U/L (ref 12–45)
BASOPHILS # BLD AUTO: 0.7 % (ref 0–1.8)
BASOPHILS # BLD: 0.06 K/UL (ref 0–0.12)
BILIRUB SERPL-MCNC: 0.2 MG/DL (ref 0.1–1.5)
BUN SERPL-MCNC: 12 MG/DL (ref 8–22)
CALCIUM SERPL-MCNC: 8.9 MG/DL (ref 8.5–10.5)
CHLORIDE SERPL-SCNC: 101 MMOL/L (ref 96–112)
CO2 SERPL-SCNC: 27 MMOL/L (ref 20–33)
CREAT SERPL-MCNC: 0.93 MG/DL (ref 0.5–1.4)
EOSINOPHIL # BLD AUTO: 0.33 K/UL (ref 0–0.51)
EOSINOPHIL NFR BLD: 3.7 % (ref 0–6.9)
ERYTHROCYTE [DISTWIDTH] IN BLOOD BY AUTOMATED COUNT: 46 FL (ref 35.9–50)
GLOBULIN SER CALC-MCNC: 4 G/DL (ref 1.9–3.5)
GLUCOSE SERPL-MCNC: 140 MG/DL (ref 65–99)
HCT VFR BLD AUTO: 34.4 % (ref 42–52)
HGB BLD-MCNC: 11 G/DL (ref 14–18)
IMM GRANULOCYTES # BLD AUTO: 0.02 K/UL (ref 0–0.11)
IMM GRANULOCYTES NFR BLD AUTO: 0.2 % (ref 0–0.9)
LIPASE SERPL-CCNC: 18 U/L (ref 11–82)
LYMPHOCYTES # BLD AUTO: 1.17 K/UL (ref 1–4.8)
LYMPHOCYTES NFR BLD: 13.2 % (ref 22–41)
MCH RBC QN AUTO: 27.1 PG (ref 27–33)
MCHC RBC AUTO-ENTMCNC: 32 G/DL (ref 33.7–35.3)
MCV RBC AUTO: 84.7 FL (ref 81.4–97.8)
MONOCYTES # BLD AUTO: 0.63 K/UL (ref 0–0.85)
MONOCYTES NFR BLD AUTO: 7.1 % (ref 0–13.4)
NEUTROPHILS # BLD AUTO: 6.66 K/UL (ref 1.82–7.42)
NEUTROPHILS NFR BLD: 75.1 % (ref 44–72)
NRBC # BLD AUTO: 0 K/UL
NRBC BLD-RTO: 0 /100 WBC
PLATELET # BLD AUTO: 544 K/UL (ref 164–446)
PMV BLD AUTO: 7.7 FL (ref 9–12.9)
POTASSIUM SERPL-SCNC: 3.7 MMOL/L (ref 3.6–5.5)
PROT SERPL-MCNC: 7.6 G/DL (ref 6–8.2)
RBC # BLD AUTO: 4.06 M/UL (ref 4.7–6.1)
SODIUM SERPL-SCNC: 137 MMOL/L (ref 135–145)
WBC # BLD AUTO: 8.9 K/UL (ref 4.8–10.8)

## 2021-10-09 PROCEDURE — 700117 HCHG RX CONTRAST REV CODE 255: Performed by: EMERGENCY MEDICINE

## 2021-10-09 PROCEDURE — 700101 HCHG RX REV CODE 250: Performed by: EMERGENCY MEDICINE

## 2021-10-09 PROCEDURE — 83690 ASSAY OF LIPASE: CPT

## 2021-10-09 PROCEDURE — 99284 EMERGENCY DEPT VISIT MOD MDM: CPT

## 2021-10-09 PROCEDURE — 96374 THER/PROPH/DIAG INJ IV PUSH: CPT

## 2021-10-09 PROCEDURE — 700111 HCHG RX REV CODE 636 W/ 250 OVERRIDE (IP): Performed by: EMERGENCY MEDICINE

## 2021-10-09 PROCEDURE — 85025 COMPLETE CBC W/AUTO DIFF WBC: CPT

## 2021-10-09 PROCEDURE — 303977 HCHG I & D

## 2021-10-09 PROCEDURE — 700102 HCHG RX REV CODE 250 W/ 637 OVERRIDE(OP): Performed by: EMERGENCY MEDICINE

## 2021-10-09 PROCEDURE — A9270 NON-COVERED ITEM OR SERVICE: HCPCS | Performed by: EMERGENCY MEDICINE

## 2021-10-09 PROCEDURE — 72193 CT PELVIS W/DYE: CPT

## 2021-10-09 PROCEDURE — 80053 COMPREHEN METABOLIC PANEL: CPT

## 2021-10-09 RX ORDER — AMOXICILLIN 250 MG
1 CAPSULE ORAL DAILY
Qty: 30 TABLET | Refills: 0 | Status: SHIPPED | OUTPATIENT
Start: 2021-10-09

## 2021-10-09 RX ORDER — LIDOCAINE HYDROCHLORIDE AND EPINEPHRINE BITARTRATE 20; .01 MG/ML; MG/ML
10 INJECTION, SOLUTION SUBCUTANEOUS ONCE
Status: COMPLETED | OUTPATIENT
Start: 2021-10-09 | End: 2021-10-09

## 2021-10-09 RX ORDER — MORPHINE SULFATE 4 MG/ML
4 INJECTION, SOLUTION INTRAMUSCULAR; INTRAVENOUS ONCE
Status: COMPLETED | OUTPATIENT
Start: 2021-10-09 | End: 2021-10-09

## 2021-10-09 RX ORDER — DIBUCAINE 0.28 G/28G
OINTMENT TOPICAL
Qty: 1 G | Refills: 0 | Status: SHIPPED | OUTPATIENT
Start: 2021-10-09

## 2021-10-09 RX ORDER — SULFAMETHOXAZOLE AND TRIMETHOPRIM 800; 160 MG/1; MG/1
1 TABLET ORAL ONCE
Status: COMPLETED | OUTPATIENT
Start: 2021-10-09 | End: 2021-10-09

## 2021-10-09 RX ORDER — SULFAMETHOXAZOLE AND TRIMETHOPRIM 800; 160 MG/1; MG/1
1 TABLET ORAL 2 TIMES DAILY
Qty: 20 TABLET | Refills: 0 | Status: SHIPPED | OUTPATIENT
Start: 2021-10-09 | End: 2021-10-19

## 2021-10-09 RX ADMIN — SULFAMETHOXAZOLE AND TRIMETHOPRIM 1 TABLET: 800; 160 TABLET ORAL at 16:27

## 2021-10-09 RX ADMIN — IOHEXOL 100 ML: 350 INJECTION, SOLUTION INTRAVENOUS at 17:12

## 2021-10-09 RX ADMIN — MORPHINE SULFATE 4 MG: 4 INJECTION INTRAVENOUS at 16:27

## 2021-10-09 RX ADMIN — LIDOCAINE HYDROCHLORIDE AND EPINEPHRINE 10 ML: 20; 10 INJECTION, SOLUTION INFILTRATION; PERINEURAL at 17:55

## 2021-10-09 ASSESSMENT — LIFESTYLE VARIABLES: DO YOU DRINK ALCOHOL: NO

## 2021-10-09 ASSESSMENT — FIBROSIS 4 INDEX: FIB4 SCORE: 0.6

## 2021-10-09 NOTE — ED TRIAGE NOTES
Himabecky Paul  45 y.o. male  Chief Complaint   Patient presents with   • Abdominal Pain   • Rectal Pain     Patient reports history of Chron's disease. Patient reports abdominal and rectal pain when having a bowel movement that started 2 weeks ago. Patient reports loose bowel movements. Denies fevers.      Vitals:    10/09/21 1323   BP: 136/85   Pulse: (!) 115   Resp: 18   Temp: 36.3 °C (97.3 °F)   SpO2: 96%

## 2021-10-09 NOTE — ED PROVIDER NOTES
"ED Provider Note    CHIEF COMPLAINT  Chief Complaint   Patient presents with   • Abdominal Pain   • Rectal Pain       HPI  Michael Paul is a 45 y.o. male who presents with chief complaint of rectal pain.  Patient has a longstanding history of Crohn's.  He was recently seen here for Crohn's flare, at that time he was having bloody stool and cramping abdominal pain.  He was started on antibiotics, finished this course and was feeling considerably improved.  The bleeding resolved.  His abdominal pain resolved.  He reports that over the last 4 days however he has developed very painful lesions in his rectum.  He reports that he has noticed some pus on his toilet paper.  He denies any fevers or chills.  He reports some mild cramping abdominal pain that is migratory that comes and goes.  Currently he reports he does not have any abdominal pain.      REVIEW OF SYSTEMS  ROS    See HPI for further details. All other systems are negative.     PAST MEDICAL HISTORY   has a past medical history of Crohn's colitis (HCC).    SOCIAL HISTORY  Social History     Tobacco Use   • Smoking status: Current Every Day Smoker     Packs/day: 0.50     Years: 15.00     Pack years: 7.50     Types: Cigarettes     Last attempt to quit: 2016     Years since quittin.1   • Smokeless tobacco: Never Used   Substance and Sexual Activity   • Alcohol use: Yes     Comment: occasional   • Drug use: Yes     Types: Inhaled     Comment: marajauana daily   • Sexual activity: Not on file       SURGICAL HISTORY   has a past surgical history that includes sigmoidoscopy flexible with biopsy (2013) and irrigation & debridement general (Right, 2015).    CURRENT MEDICATIONS  Home Medications    **Home medications have not yet been reviewed for this encounter**         ALLERGIES  Allergies   Allergen Reactions   • Prednisone Hives     \"causes infection\"        PHYSICAL EXAM  Vitals:    10/09/21 1323   BP: 136/85   Pulse: (!) 115   Resp: 18 "   Temp: 36.3 °C (97.3 °F)   SpO2: 96%       Physical Exam  Constitutional:       Appearance: He is well-developed.   HENT:      Head: Normocephalic and atraumatic.   Eyes:      Conjunctiva/sclera: Conjunctivae normal.      Pupils: Pupils are equal, round, and reactive to light.   Cardiovascular:      Rate and Rhythm: Normal rate and regular rhythm.      Heart sounds: No murmur heard.   No friction rub. No gallop.    Pulmonary:      Effort: Pulmonary effort is normal. No respiratory distress.      Breath sounds: Normal breath sounds. No wheezing.   Abdominal:      General: Abdomen is flat. Bowel sounds are normal. There is no distension.      Palpations: Abdomen is soft.      Tenderness: There is no abdominal tenderness. There is no rebound.      Comments: Chaperoned exam.  There are multiple small focal areas of fluctuance with exudate, patient has considerable pain on rectal exam   Musculoskeletal:      Cervical back: Normal range of motion and neck supple.   Skin:     General: Skin is warm and dry.   Neurological:      Mental Status: He is alert and oriented to person, place, and time.   Psychiatric:         Behavior: Behavior normal.           DIAGNOSTIC STUDIES / PROCEDURES      LABS  Results for orders placed or performed during the hospital encounter of 10/09/21   CBC WITH DIFFERENTIAL   Result Value Ref Range    WBC 8.9 4.8 - 10.8 K/uL    RBC 4.06 (L) 4.70 - 6.10 M/uL    Hemoglobin 11.0 (L) 14.0 - 18.0 g/dL    Hematocrit 34.4 (L) 42.0 - 52.0 %    MCV 84.7 81.4 - 97.8 fL    MCH 27.1 27.0 - 33.0 pg    MCHC 32.0 (L) 33.7 - 35.3 g/dL    RDW 46.0 35.9 - 50.0 fL    Platelet Count 544 (H) 164 - 446 K/uL    MPV 7.7 (L) 9.0 - 12.9 fL    Neutrophils-Polys 75.10 (H) 44.00 - 72.00 %    Lymphocytes 13.20 (L) 22.00 - 41.00 %    Monocytes 7.10 0.00 - 13.40 %    Eosinophils 3.70 0.00 - 6.90 %    Basophils 0.70 0.00 - 1.80 %    Immature Granulocytes 0.20 0.00 - 0.90 %    Nucleated RBC 0.00 /100 WBC    Neutrophils (Absolute)  6.66 1.82 - 7.42 K/uL    Lymphs (Absolute) 1.17 1.00 - 4.80 K/uL    Monos (Absolute) 0.63 0.00 - 0.85 K/uL    Eos (Absolute) 0.33 0.00 - 0.51 K/uL    Baso (Absolute) 0.06 0.00 - 0.12 K/uL    Immature Granulocytes (abs) 0.02 0.00 - 0.11 K/uL    NRBC (Absolute) 0.00 K/uL   COMP METABOLIC PANEL   Result Value Ref Range    Sodium 137 135 - 145 mmol/L    Potassium 3.7 3.6 - 5.5 mmol/L    Chloride 101 96 - 112 mmol/L    Co2 27 20 - 33 mmol/L    Anion Gap 9.0 7.0 - 16.0    Glucose 140 (H) 65 - 99 mg/dL    Bun 12 8 - 22 mg/dL    Creatinine 0.93 0.50 - 1.40 mg/dL    Calcium 8.9 8.5 - 10.5 mg/dL    AST(SGOT) 13 12 - 45 U/L    ALT(SGPT) 9 2 - 50 U/L    Alkaline Phosphatase 101 (H) 30 - 99 U/L    Total Bilirubin 0.2 0.1 - 1.5 mg/dL    Albumin 3.6 3.2 - 4.9 g/dL    Total Protein 7.6 6.0 - 8.2 g/dL    Globulin 4.0 (H) 1.9 - 3.5 g/dL    A-G Ratio 0.9 g/dL   LIPASE   Result Value Ref Range    Lipase 18 11 - 82 U/L   ESTIMATED GFR   Result Value Ref Range    GFR If African American >60 >60 mL/min/1.73 m 2    GFR If Non African American >60 >60 mL/min/1.73 m 2         RADIOLOGY  CT-PELVIS WITH   Final Result      1.  No evidence of perianal or perirectal abscess.      2.  Mild diffuse sigmoid bowel wall thickening consistent with history of Crohn's disease.              COURSE & MEDICAL DECISION MAKING  Pertinent Labs & Imaging studies reviewed. (See chart for details)    Patient here with symptoms consistent with perirectal abscess.  I am unable to fully assess the depth given patient's considerable pain on exam.  Patient will be given fentanyl for pain, will also CT patient's pelvis to evaluate for extent of disease.  CT fails to reveal any evidence of extension.  I performed a bedside incision and drainage, a small amount of exudate was expressed.  Possible associated fistula.  Patient will need follow-up with general surgery, I have given him follow-up for this.  We will place him on Bactrim and have him follow-up with general  surgery and his primary care physician.  Return precautions discussed.  Dibucaine and senna docusate for symptomatic management.     The patient will return for worsening symptoms and is stable at the time of discharge. The patient verbalizes understanding and will comply.    FINAL IMPRESSION    1. Rectal pain            Electronically signed by: Felix Beebe M.D., 10/9/2021 4:00 PM

## 2021-10-10 NOTE — DISCHARGE INSTRUCTIONS
You likely have a small anal fistula which is very common complication of Crohn's.  We will give you some antibiotics as it does appear to be an associated infection here and give you some numbing cream and stool softeners to help with your symptoms.  Please return if your symptoms worsen.  You will need to follow-up with a general surgeon for further care.

## 2024-09-04 ENCOUNTER — HOSPITAL ENCOUNTER (EMERGENCY)
Facility: MEDICAL CENTER | Age: 49
End: 2024-09-04
Attending: EMERGENCY MEDICINE

## 2024-09-04 ENCOUNTER — APPOINTMENT (OUTPATIENT)
Dept: RADIOLOGY | Facility: MEDICAL CENTER | Age: 49
End: 2024-09-04

## 2024-09-04 VITALS
TEMPERATURE: 97.9 F | WEIGHT: 145.5 LBS | RESPIRATION RATE: 20 BRPM | DIASTOLIC BLOOD PRESSURE: 85 MMHG | BODY MASS INDEX: 23.38 KG/M2 | SYSTOLIC BLOOD PRESSURE: 124 MMHG | HEART RATE: 52 BPM | OXYGEN SATURATION: 98 % | HEIGHT: 66 IN

## 2024-09-04 DIAGNOSIS — R07.9 CHEST PAIN, UNSPECIFIED TYPE: ICD-10-CM

## 2024-09-04 DIAGNOSIS — K29.70 GASTRITIS WITHOUT BLEEDING, UNSPECIFIED CHRONICITY, UNSPECIFIED GASTRITIS TYPE: ICD-10-CM

## 2024-09-04 LAB
ALBUMIN SERPL BCP-MCNC: 4.4 G/DL (ref 3.2–4.9)
ALBUMIN/GLOB SERPL: 1.2 G/DL
ALP SERPL-CCNC: 121 U/L (ref 30–99)
ALT SERPL-CCNC: 21 U/L (ref 2–50)
ANION GAP SERPL CALC-SCNC: 13 MMOL/L (ref 7–16)
AST SERPL-CCNC: 34 U/L (ref 12–45)
BASOPHILS # BLD AUTO: 0.5 % (ref 0–1.8)
BASOPHILS # BLD: 0.04 K/UL (ref 0–0.12)
BILIRUB SERPL-MCNC: 0.3 MG/DL (ref 0.1–1.5)
BUN SERPL-MCNC: 13 MG/DL (ref 8–22)
CALCIUM ALBUM COR SERPL-MCNC: 9.3 MG/DL (ref 8.5–10.5)
CALCIUM SERPL-MCNC: 9.6 MG/DL (ref 8.5–10.5)
CHLORIDE SERPL-SCNC: 102 MMOL/L (ref 96–112)
CO2 SERPL-SCNC: 22 MMOL/L (ref 20–33)
CREAT SERPL-MCNC: 1.07 MG/DL (ref 0.5–1.4)
EKG IMPRESSION: NORMAL
EOSINOPHIL # BLD AUTO: 0.04 K/UL (ref 0–0.51)
EOSINOPHIL NFR BLD: 0.5 % (ref 0–6.9)
ERYTHROCYTE [DISTWIDTH] IN BLOOD BY AUTOMATED COUNT: 49.1 FL (ref 35.9–50)
GFR SERPLBLD CREATININE-BSD FMLA CKD-EPI: 86 ML/MIN/1.73 M 2
GLOBULIN SER CALC-MCNC: 3.7 G/DL (ref 1.9–3.5)
GLUCOSE SERPL-MCNC: 86 MG/DL (ref 65–99)
HCT VFR BLD AUTO: 45.5 % (ref 42–52)
HGB BLD-MCNC: 15.6 G/DL (ref 14–18)
IMM GRANULOCYTES # BLD AUTO: 0.02 K/UL (ref 0–0.11)
IMM GRANULOCYTES NFR BLD AUTO: 0.2 % (ref 0–0.9)
LYMPHOCYTES # BLD AUTO: 1.8 K/UL (ref 1–4.8)
LYMPHOCYTES NFR BLD: 20.6 % (ref 22–41)
MCH RBC QN AUTO: 31.6 PG (ref 27–33)
MCHC RBC AUTO-ENTMCNC: 34.3 G/DL (ref 32.3–36.5)
MCV RBC AUTO: 92.3 FL (ref 81.4–97.8)
MONOCYTES # BLD AUTO: 0.73 K/UL (ref 0–0.85)
MONOCYTES NFR BLD AUTO: 8.4 % (ref 0–13.4)
NEUTROPHILS # BLD AUTO: 6.11 K/UL (ref 1.82–7.42)
NEUTROPHILS NFR BLD: 69.8 % (ref 44–72)
NRBC # BLD AUTO: 0 K/UL
NRBC BLD-RTO: 0 /100 WBC (ref 0–0.2)
PLATELET # BLD AUTO: 381 K/UL (ref 164–446)
PMV BLD AUTO: 8.4 FL (ref 9–12.9)
POTASSIUM SERPL-SCNC: 3.9 MMOL/L (ref 3.6–5.5)
PROT SERPL-MCNC: 8.1 G/DL (ref 6–8.2)
RBC # BLD AUTO: 4.93 M/UL (ref 4.7–6.1)
SODIUM SERPL-SCNC: 137 MMOL/L (ref 135–145)
TROPONIN T SERPL-MCNC: <6 NG/L (ref 6–19)
WBC # BLD AUTO: 8.7 K/UL (ref 4.8–10.8)

## 2024-09-04 PROCEDURE — 85025 COMPLETE CBC W/AUTO DIFF WBC: CPT

## 2024-09-04 PROCEDURE — 71045 X-RAY EXAM CHEST 1 VIEW: CPT

## 2024-09-04 PROCEDURE — 93005 ELECTROCARDIOGRAM TRACING: CPT | Performed by: EMERGENCY MEDICINE

## 2024-09-04 PROCEDURE — 36415 COLL VENOUS BLD VENIPUNCTURE: CPT

## 2024-09-04 PROCEDURE — 80053 COMPREHEN METABOLIC PANEL: CPT

## 2024-09-04 PROCEDURE — 99284 EMERGENCY DEPT VISIT MOD MDM: CPT

## 2024-09-04 PROCEDURE — 93005 ELECTROCARDIOGRAM TRACING: CPT

## 2024-09-04 PROCEDURE — 84484 ASSAY OF TROPONIN QUANT: CPT

## 2024-09-04 RX ORDER — OMEPRAZOLE 40 MG/1
40 CAPSULE, DELAYED RELEASE ORAL 2 TIMES DAILY
Qty: 20 CAPSULE | Refills: 0 | Status: SHIPPED | OUTPATIENT
Start: 2024-09-04 | End: 2024-09-14

## 2024-09-04 ASSESSMENT — HEART SCORE
AGE: 45-64
HEART SCORE: 1
RISK FACTORS: NO KNOWN RISK FACTORS
HISTORY: SLIGHTLY SUSPICIOUS
ECG: NORMAL
TROPONIN: LESS THAN OR EQUAL TO NORMAL LIMIT

## 2024-09-04 NOTE — DISCHARGE INSTRUCTIONS
Take a daily baby aspirin.  The scheduling department will contact you for outpatient follow-up with a primary care provider as well as for outpatient stress testing.  Return if you are acutely worse.

## 2024-09-04 NOTE — ED TRIAGE NOTES
".  Chief Complaint   Patient presents with    Chest Pain     L sided chest pain x 3 days. Intermittent. Describes pain as \"Uncomfortable\". Radiates to L arm. Pt states L arms is painful when he raises it. Pt states unable to stay at work. Work has sent him home. Pt concern for high BP.   Denies SOB.   Denies cardiac HX.    Pt Aox4, GCS15 and speaks in complete sentences. Educated on triage process and to alert staff if anything changes.   "

## 2024-09-04 NOTE — ED NOTES
Pt discharged to lobby with steady gait. GCS 15. Pt in possession of belongings. Pt provided discharge education and information pertaining to medications and follow up appointments. Pt received copy of discharge instructions and verbalized understanding.     Vitals:    09/04/24 1533   BP: 124/85   Pulse: (!) 52   Resp: 20   Temp: 36.6 °C (97.9 °F)   SpO2: 98%

## 2024-09-04 NOTE — ED PROVIDER NOTES
ED Provider Note    CHIEF COMPLAINT  Chief Complaint   Patient presents with    Chest Pain       HPI/ROS    Michael Paul is a 48 y.o. male who presents with substernal chest discomfort.  The patient has been having the pain over the last couple of days.  He states is intermittent in nature.  He is unaware of any exacerbating or relieving factors.  He does not have any pain or swelling to his lower extremities.  He states that he quit smoking does not have any risk factors cardiovascular standpoint.  He denies cocaine and amphetamine abuse.  He has been under a significant mount of stress.  He states his blood pressure has been periodic elevated while at work when he is been having the chest pain.    PAST MEDICAL HISTORY   has a past medical history of Crohn's colitis (HCC).    SURGICAL HISTORY   has a past surgical history that includes sigmoidoscopy flexible with biopsy (2013) and irrigation & debridement general (Right, 2015).    FAMILY HISTORY  Family History   Problem Relation Age of Onset    No Known Problems Mother     No Known Problems Father        SOCIAL HISTORY  Social History     Tobacco Use    Smoking status: Every Day     Current packs/day: 0.00     Average packs/day: 0.5 packs/day for 15.0 years (7.5 ttl pk-yrs)     Types: Cigarettes     Start date: 2001     Last attempt to quit: 2016     Years since quittin.0    Smokeless tobacco: Never   Vaping Use    Vaping status: Every Day    Substances: Nicotine, THC   Substance and Sexual Activity    Alcohol use: Yes     Comment: occasional    Drug use: Yes     Types: Inhaled     Comment: marijuana, cocaine    Sexual activity: Not on file       CURRENT MEDICATIONS  Home Medications       Reviewed by Laine Sandhu R.N. (Registered Nurse) on 24 at 1423  Med List Status: Partial     Medication Last Dose Status   acetaminophen (TYLENOL) 500 MG Tab  Active   Cholecalciferol (VITAMIN D3) 5000 units Cap  Active   dibucaine  "(NUPERCAINAL) 1 % ointment  Active   omeprazole (PRILOSEC) 40 MG delayed-release capsule  Active   predniSONE (DELTASONE) 20 MG Tab  Active   promethazine (PHENERGAN) 25 MG Tab  Active   senna-docusate (PERICOLACE OR SENOKOT S) 8.6-50 MG Tab  Active   sulfaSALAzine (AZULFIDINE) 500 MG Tab  Active                  Audit from Redirected Encounters    **Home medications have not yet been reviewed for this encounter**         ALLERGIES  Allergies   Allergen Reactions    Prednisone Hives     \"causes infection\"        PHYSICAL EXAM  VITAL SIGNS: /81   Pulse 61   Temp 36.6 °C (97.9 °F) (Temporal)   Resp 17   Ht 1.676 m (5' 6\")   Wt 66 kg (145 lb 8.1 oz)   SpO2 97%   BMI 23.48 kg/m²    In general the patient seems anxious but no acute distress    HEENT unremarkable     Pulmonary the patient's lungs are clear auscultation bilaterally    Cardiovascular S1-S2 with a regular rate and rhythm    GI the patient's abdomen is soft    Skin no rashes, pallor, no jaundice    Extremities no distal edema    Neurologic examination is grossly intact    EKG/LABS  Results for orders placed or performed during the hospital encounter of 09/04/24   CBC with Differential   Result Value Ref Range    WBC 8.7 4.8 - 10.8 K/uL    RBC 4.93 4.70 - 6.10 M/uL    Hemoglobin 15.6 14.0 - 18.0 g/dL    Hematocrit 45.5 42.0 - 52.0 %    MCV 92.3 81.4 - 97.8 fL    MCH 31.6 27.0 - 33.0 pg    MCHC 34.3 32.3 - 36.5 g/dL    RDW 49.1 35.9 - 50.0 fL    Platelet Count 381 164 - 446 K/uL    MPV 8.4 (L) 9.0 - 12.9 fL    Neutrophils-Polys 69.80 44.00 - 72.00 %    Lymphocytes 20.60 (L) 22.00 - 41.00 %    Monocytes 8.40 0.00 - 13.40 %    Eosinophils 0.50 0.00 - 6.90 %    Basophils 0.50 0.00 - 1.80 %    Immature Granulocytes 0.20 0.00 - 0.90 %    Nucleated RBC 0.00 0.00 - 0.20 /100 WBC    Neutrophils (Absolute) 6.11 1.82 - 7.42 K/uL    Lymphs (Absolute) 1.80 1.00 - 4.80 K/uL    Monos (Absolute) 0.73 0.00 - 0.85 K/uL    Eos (Absolute) 0.04 0.00 - 0.51 K/uL    " Baso (Absolute) 0.04 0.00 - 0.12 K/uL    Immature Granulocytes (abs) 0.02 0.00 - 0.11 K/uL    NRBC (Absolute) 0.00 K/uL   Complete Metabolic Panel (CMP)   Result Value Ref Range    Sodium 137 135 - 145 mmol/L    Potassium 3.9 3.6 - 5.5 mmol/L    Chloride 102 96 - 112 mmol/L    Co2 22 20 - 33 mmol/L    Anion Gap 13.0 7.0 - 16.0    Glucose 86 65 - 99 mg/dL    Bun 13 8 - 22 mg/dL    Creatinine 1.07 0.50 - 1.40 mg/dL    Calcium 9.6 8.5 - 10.5 mg/dL    Correct Calcium 9.3 8.5 - 10.5 mg/dL    AST(SGOT) 34 12 - 45 U/L    ALT(SGPT) 21 2 - 50 U/L    Alkaline Phosphatase 121 (H) 30 - 99 U/L    Total Bilirubin 0.3 0.1 - 1.5 mg/dL    Albumin 4.4 3.2 - 4.9 g/dL    Total Protein 8.1 6.0 - 8.2 g/dL    Globulin 3.7 (H) 1.9 - 3.5 g/dL    A-G Ratio 1.2 g/dL   Troponins NOW   Result Value Ref Range    Troponin T <6 6 - 19 ng/L   ESTIMATED GFR   Result Value Ref Range    GFR (CKD-EPI) 86 >60 mL/min/1.73 m 2   EKG (NOW)   Result Value Ref Range    Report       [facility]    Test Date:  2024  Pt Name:    DOMINICK SALINAS           Department: ER  MRN:        [id number]                      Room:  Gender:     Male                         Technician: [id number]  :        [id number]                   Requested By:ER TRIAGE PROTOCOL  Order #:    [id number]                    Reading MD: HIPOLITO SILVERMAN MD    Measurements  Intervals                                Axis  Rate:       79                           P:          67  DC:         138                          QRS:        58  QRSD:       99                           T:          58  QT:         375  QTc:        430    Interpretive Statements  Twelve-lead EKG shows a normal sinus rhythm with a ventricular rate of 79,  normal QRS, normal intervals, no ST segment elevation or depression, normal T  waves.  Overall no acute ischemic change  Electronically Signed On 2024 15:34:05 PDT by HIPOLITO SILVERMAN MD         I have independently interpreted  this EKG  HEART Score: 1    RADIOLOGY/PROCEDURES   I have independently interpreted the diagnostic imaging associated with this visit and am waiting the final reading from the radiologist.   My preliminary interpretation is as follows: Chest x-ray is reviewed and there is no cardiomegaly nor evidence of focal infiltrates to support pneumonia    Radiologist interpretation:  DX-CHEST-PORTABLE (1 VIEW)   Final Result      No cardiopulmonary abnormality          COURSE & MEDICAL DECISION MAKING    This a 48-year-old male who presents the emergency department with chest discomfort.  I do not have a clear etiology at this time.  The patient's very low risk from a cardiovascular standpoint with a heart score of 1.  His EKG and troponin does not support ischemia.  He said the pain ongoing for greater than 4 to 6 hours therefore the one-time troponin does effectively rule out ischemia.  From a pulmonary embolus standpoint the patient is not hypoxic nor is he tachycardic.  Furthermore he does not have any risk factors from a PE standpoint.  My suspicion is this is from gastritis and esophagitis from his stress.  The patient is currently on Prilosec and is encouraged to continue that medication.  I would like him to take a daily baby aspirin until he is ruled out from a cardiovascular standpoint on an outpatient basis.  I will refer the patient to cardiology for outpatient stress testing and also placed a note for the scheduling department to contact the patient for primary care follow-up as well as cardiology follow-up.  The patient will return if he is acutely worse.    FINAL DIAGNOSIS  Chest pain    Disposition  Patient will be discharged in stable condition     Electronically signed by: Javy Mccollum M.D., 9/4/2024 3:39 PM

## 2024-09-05 ENCOUNTER — PATIENT OUTREACH (OUTPATIENT)
Dept: SCHEDULING | Facility: IMAGING CENTER | Age: 49
End: 2024-09-05

## 2024-09-06 ENCOUNTER — PATIENT OUTREACH (OUTPATIENT)
Dept: SCHEDULING | Facility: IMAGING CENTER | Age: 49
End: 2024-09-06

## 2024-09-09 ENCOUNTER — PATIENT OUTREACH (OUTPATIENT)
Dept: SCHEDULING | Facility: IMAGING CENTER | Age: 49
End: 2024-09-09

## 2024-09-17 ENCOUNTER — TELEPHONE (OUTPATIENT)
Dept: HEALTH INFORMATION MANAGEMENT | Facility: OTHER | Age: 49
End: 2024-09-17

## 2024-10-16 ENCOUNTER — APPOINTMENT (OUTPATIENT)
Dept: RADIOLOGY | Facility: MEDICAL CENTER | Age: 49
End: 2024-10-16
Attending: EMERGENCY MEDICINE
Payer: COMMERCIAL

## 2024-10-16 ENCOUNTER — HOSPITAL ENCOUNTER (EMERGENCY)
Facility: MEDICAL CENTER | Age: 49
End: 2024-10-16
Attending: EMERGENCY MEDICINE
Payer: COMMERCIAL

## 2024-10-16 VITALS
HEIGHT: 67 IN | HEART RATE: 75 BPM | DIASTOLIC BLOOD PRESSURE: 77 MMHG | SYSTOLIC BLOOD PRESSURE: 124 MMHG | BODY MASS INDEX: 22.56 KG/M2 | OXYGEN SATURATION: 97 % | TEMPERATURE: 97.6 F | WEIGHT: 143.74 LBS | RESPIRATION RATE: 18 BRPM

## 2024-10-16 DIAGNOSIS — K50.10 CROHN'S DISEASE OF COLON WITHOUT COMPLICATION (HCC): ICD-10-CM

## 2024-10-16 LAB
ABO GROUP BLD: NORMAL
ALBUMIN SERPL BCP-MCNC: 4.2 G/DL (ref 3.2–4.9)
ALBUMIN/GLOB SERPL: 1.1 G/DL
ALP SERPL-CCNC: 141 U/L (ref 30–99)
ALT SERPL-CCNC: 34 U/L (ref 2–50)
ANION GAP SERPL CALC-SCNC: 16 MMOL/L (ref 7–16)
APTT PPP: 27.9 SEC (ref 24.7–36)
AST SERPL-CCNC: 39 U/L (ref 12–45)
BASOPHILS # BLD AUTO: 0.8 % (ref 0–1.8)
BASOPHILS # BLD: 0.1 K/UL (ref 0–0.12)
BILIRUB SERPL-MCNC: 0.3 MG/DL (ref 0.1–1.5)
BLD GP AB SCN SERPL QL: NORMAL
BUN SERPL-MCNC: 11 MG/DL (ref 8–22)
CALCIUM ALBUM COR SERPL-MCNC: 9.5 MG/DL (ref 8.5–10.5)
CALCIUM SERPL-MCNC: 9.7 MG/DL (ref 8.5–10.5)
CHLORIDE SERPL-SCNC: 95 MMOL/L (ref 96–112)
CO2 SERPL-SCNC: 22 MMOL/L (ref 20–33)
CREAT SERPL-MCNC: 0.91 MG/DL (ref 0.5–1.4)
EOSINOPHIL # BLD AUTO: 0.12 K/UL (ref 0–0.51)
EOSINOPHIL NFR BLD: 1 % (ref 0–6.9)
ERYTHROCYTE [DISTWIDTH] IN BLOOD BY AUTOMATED COUNT: 43.5 FL (ref 35.9–50)
GFR SERPLBLD CREATININE-BSD FMLA CKD-EPI: 104 ML/MIN/1.73 M 2
GLOBULIN SER CALC-MCNC: 4 G/DL (ref 1.9–3.5)
GLUCOSE SERPL-MCNC: 95 MG/DL (ref 65–99)
HCT VFR BLD AUTO: 45.3 % (ref 42–52)
HGB BLD-MCNC: 15.8 G/DL (ref 14–18)
IMM GRANULOCYTES # BLD AUTO: 0.04 K/UL (ref 0–0.11)
IMM GRANULOCYTES NFR BLD AUTO: 0.3 % (ref 0–0.9)
INR PPP: 0.92 (ref 0.87–1.13)
LIPASE SERPL-CCNC: 27 U/L (ref 11–82)
LYMPHOCYTES # BLD AUTO: 1.59 K/UL (ref 1–4.8)
LYMPHOCYTES NFR BLD: 12.9 % (ref 22–41)
MCH RBC QN AUTO: 31.5 PG (ref 27–33)
MCHC RBC AUTO-ENTMCNC: 34.9 G/DL (ref 32.3–36.5)
MCV RBC AUTO: 90.2 FL (ref 81.4–97.8)
MONOCYTES # BLD AUTO: 1.17 K/UL (ref 0–0.85)
MONOCYTES NFR BLD AUTO: 9.5 % (ref 0–13.4)
NEUTROPHILS # BLD AUTO: 9.28 K/UL (ref 1.82–7.42)
NEUTROPHILS NFR BLD: 75.5 % (ref 44–72)
NRBC # BLD AUTO: 0 K/UL
NRBC BLD-RTO: 0 /100 WBC (ref 0–0.2)
PLATELET # BLD AUTO: 338 K/UL (ref 164–446)
PMV BLD AUTO: 8.3 FL (ref 9–12.9)
POTASSIUM SERPL-SCNC: 3.3 MMOL/L (ref 3.6–5.5)
PROT SERPL-MCNC: 8.2 G/DL (ref 6–8.2)
PROTHROMBIN TIME: 12.4 SEC (ref 12–14.6)
RBC # BLD AUTO: 5.02 M/UL (ref 4.7–6.1)
RH BLD: NORMAL
SODIUM SERPL-SCNC: 133 MMOL/L (ref 135–145)
WBC # BLD AUTO: 12.3 K/UL (ref 4.8–10.8)

## 2024-10-16 PROCEDURE — RXMED WILLOW AMBULATORY MEDICATION CHARGE: Performed by: EMERGENCY MEDICINE

## 2024-10-16 PROCEDURE — 700117 HCHG RX CONTRAST REV CODE 255: Performed by: EMERGENCY MEDICINE

## 2024-10-16 PROCEDURE — 83690 ASSAY OF LIPASE: CPT

## 2024-10-16 PROCEDURE — 99284 EMERGENCY DEPT VISIT MOD MDM: CPT

## 2024-10-16 PROCEDURE — 96375 TX/PRO/DX INJ NEW DRUG ADDON: CPT

## 2024-10-16 PROCEDURE — 86901 BLOOD TYPING SEROLOGIC RH(D): CPT

## 2024-10-16 PROCEDURE — 85025 COMPLETE CBC W/AUTO DIFF WBC: CPT

## 2024-10-16 PROCEDURE — 86900 BLOOD TYPING SEROLOGIC ABO: CPT

## 2024-10-16 PROCEDURE — 700111 HCHG RX REV CODE 636 W/ 250 OVERRIDE (IP): Mod: JZ | Performed by: EMERGENCY MEDICINE

## 2024-10-16 PROCEDURE — 86850 RBC ANTIBODY SCREEN: CPT

## 2024-10-16 PROCEDURE — 80053 COMPREHEN METABOLIC PANEL: CPT

## 2024-10-16 PROCEDURE — 96374 THER/PROPH/DIAG INJ IV PUSH: CPT

## 2024-10-16 PROCEDURE — 85610 PROTHROMBIN TIME: CPT

## 2024-10-16 PROCEDURE — 74177 CT ABD & PELVIS W/CONTRAST: CPT

## 2024-10-16 PROCEDURE — 85730 THROMBOPLASTIN TIME PARTIAL: CPT

## 2024-10-16 PROCEDURE — 36415 COLL VENOUS BLD VENIPUNCTURE: CPT

## 2024-10-16 RX ORDER — PREDNISONE 10 MG/1
TABLET ORAL
Qty: 128 TABLET | Refills: 0 | Status: SHIPPED | OUTPATIENT
Start: 2024-10-16 | End: 2024-12-05

## 2024-10-16 RX ORDER — SULFASALAZINE 500 MG/1
500 TABLET ORAL 4 TIMES DAILY
Qty: 120 TABLET | Refills: 3 | Status: SHIPPED | OUTPATIENT
Start: 2024-10-16

## 2024-10-16 RX ORDER — MORPHINE SULFATE 4 MG/ML
4 INJECTION INTRAVENOUS ONCE
Status: COMPLETED | OUTPATIENT
Start: 2024-10-16 | End: 2024-10-16

## 2024-10-16 RX ORDER — ONDANSETRON 2 MG/ML
4 INJECTION INTRAMUSCULAR; INTRAVENOUS ONCE
Status: COMPLETED | OUTPATIENT
Start: 2024-10-16 | End: 2024-10-16

## 2024-10-16 RX ORDER — METHYLPREDNISOLONE SODIUM SUCCINATE 125 MG/2ML
62.5 INJECTION, POWDER, LYOPHILIZED, FOR SOLUTION INTRAMUSCULAR; INTRAVENOUS ONCE
Status: COMPLETED | OUTPATIENT
Start: 2024-10-16 | End: 2024-10-16

## 2024-10-16 RX ADMIN — MORPHINE SULFATE 4 MG: 4 INJECTION, SOLUTION INTRAMUSCULAR; INTRAVENOUS at 12:43

## 2024-10-16 RX ADMIN — IOHEXOL 100 ML: 350 INJECTION, SOLUTION INTRAVENOUS at 14:25

## 2024-10-16 RX ADMIN — ONDANSETRON 4 MG: 2 INJECTION INTRAMUSCULAR; INTRAVENOUS at 12:42

## 2024-10-16 RX ADMIN — METHYLPREDNISOLONE SODIUM SUCCINATE 62.5 MG: 125 INJECTION, POWDER, FOR SOLUTION INTRAMUSCULAR; INTRAVENOUS at 16:05

## 2024-10-16 ASSESSMENT — FIBROSIS 4 INDEX: FIB4 SCORE: 0.92

## 2024-10-17 ENCOUNTER — PHARMACY VISIT (OUTPATIENT)
Dept: PHARMACY | Facility: MEDICAL CENTER | Age: 49
End: 2024-10-17
Payer: COMMERCIAL